# Patient Record
Sex: FEMALE | Race: WHITE | NOT HISPANIC OR LATINO | Employment: OTHER | URBAN - METROPOLITAN AREA
[De-identification: names, ages, dates, MRNs, and addresses within clinical notes are randomized per-mention and may not be internally consistent; named-entity substitution may affect disease eponyms.]

---

## 2017-03-02 RX ORDER — OXYCODONE AND ACETAMINOPHEN 10; 325 MG/1; MG/1
TABLET ORAL
Qty: 120 TABLET | Refills: 0 | Status: SHIPPED | OUTPATIENT
Start: 2017-03-02 | End: 2017-04-12 | Stop reason: SDUPTHER

## 2017-03-02 NOTE — TELEPHONE ENCOUNTER
Spoke to Luis from [facility] and calling for pain medication refill. Pended to you. States patient only has 2 days left.

## 2017-04-12 ENCOUNTER — TELEPHONE (OUTPATIENT)
Dept: FAMILY MEDICINE | Facility: CLINIC | Age: 77
End: 2017-04-12

## 2017-04-12 RX ORDER — OXYCODONE AND ACETAMINOPHEN 10; 325 MG/1; MG/1
TABLET ORAL
Qty: 120 TABLET | Refills: 0 | Status: SHIPPED | OUTPATIENT
Start: 2017-04-12 | End: 2017-11-08 | Stop reason: SDUPTHER

## 2017-04-12 NOTE — TELEPHONE ENCOUNTER
----- Message from Flora Wu sent at 4/12/2017  9:04 AM CDT -----  Refill on Rx Oxycodone Acetaminophen 10 mg.  Please send into [facility].  Any questions call Sae Melton/Leanna at [phone]

## 2017-04-13 RX ORDER — OXYCODONE AND ACETAMINOPHEN 10; 325 MG/1; MG/1
TABLET ORAL
Qty: 120 TABLET | Refills: 0 | Status: SHIPPED | OUTPATIENT
Start: 2017-04-13 | End: 2017-06-20 | Stop reason: SDUPTHER

## 2017-04-13 NOTE — TELEPHONE ENCOUNTER
----- Message from Liliane Marie sent at 4/13/2017 10:05 AM CDT -----  Contact: Leanna with Sae Melton # [phone]  Refill on Pain Medication   Call back on # [phone]  thanks      [facility]ANDRA [facility] - [place] - [address]   [place] [address]  Phone: [phone] Fax: [phone]

## 2017-04-17 ENCOUNTER — TELEPHONE (OUTPATIENT)
Dept: FAMILY MEDICINE | Facility: CLINIC | Age: 77
End: 2017-04-17

## 2017-04-17 NOTE — TELEPHONE ENCOUNTER
----- Message from Nicolette Avina sent at 4/17/2017  1:24 PM CDT -----  Contact: Janelle from [facility] in [place]  Calling again to has requested refill Rx Oxycodone to be sent to     [facility] - [place] - [address] [place] [address]  Phone: [phone] Fax: [phone]    Out of medication. Please call [phone] and ask for patient's nurse. Thanks!

## 2017-04-28 ENCOUNTER — EXTERNAL VISIT (OUTPATIENT)
Dept: FAMILY MEDICINE | Facility: CLINIC | Age: 77
End: 2017-04-28
Payer: MEDICARE

## 2017-04-28 DIAGNOSIS — G54.6 PHANTOM LIMB SYNDROME WITH PAIN: Primary | ICD-10-CM

## 2017-04-28 DIAGNOSIS — F32.A DEPRESSION, UNSPECIFIED DEPRESSION TYPE: ICD-10-CM

## 2017-04-28 DIAGNOSIS — E66.01 MORBID OBESITY DUE TO EXCESS CALORIES: ICD-10-CM

## 2017-04-28 PROCEDURE — 99308 SBSQ NF CARE LOW MDM 20: CPT | Mod: S$PBB,,, | Performed by: FAMILY MEDICINE

## 2017-05-19 ENCOUNTER — EXTERNAL VISIT (OUTPATIENT)
Dept: FAMILY MEDICINE | Facility: CLINIC | Age: 77
End: 2017-05-19
Payer: MEDICARE

## 2017-05-19 DIAGNOSIS — G54.6 PHANTOM LIMB SYNDROME WITH PAIN: Primary | ICD-10-CM

## 2017-05-19 DIAGNOSIS — M51.9 THORACIC, THORACOLUMBAR AND LUMBOSACRAL INTERVERTEBRAL DISC DISORDER: ICD-10-CM

## 2017-05-19 DIAGNOSIS — Z89.611 STATUS POST ABOVE KNEE AMPUTATION OF RIGHT LOWER EXTREMITY: Chronic | ICD-10-CM

## 2017-05-19 DIAGNOSIS — N18.30 CKD (CHRONIC KIDNEY DISEASE), STAGE III: Chronic | ICD-10-CM

## 2017-05-19 PROCEDURE — 99308 SBSQ NF CARE LOW MDM 20: CPT | Mod: S$PBB,,, | Performed by: FAMILY MEDICINE

## 2017-05-21 NOTE — PROGRESS NOTES
Subjective:     THIS DOCUMENT WAS MADE IN PART WITH [employer] DICTATION SOFTWARE.  OCCASIONALLY THIS SOFTWARE WILL MISINTERPRET WORDS OR PHRASES.     Patient ID: Nahun Cohen is a 77 y.o. female.    Chief Complaint: routine external nursing home visit    Complete past medical history, medications, allergies, current vitals, and labs are available in the nursing home chart.  The nursing home chart is more up-to-date than the electronic medical record here.  If there are any discrepancies or questions, deferred the nursing home chart.    [facility]ospitals in [place]      routine nursing on follow-up. The patient complains about worsening phantom limb syndrome. She has a history of a right above the knee amputation. Symptoms have been present waxing and waning for several years. She seemed to do best with the Lidoderm patches but the facility and/or her insurance company will not pay for that. They have also refused to try compounded topical lidocaine preparations all because of cost concerns, and I do understand that these parts are quite [place]. She remains on a very high dose of gabapentin and narcotics and I don't feel comfortable increasing either because they are high doses already. She has had mild improvement with topical Voltaren.     she has history depression which overall has improved. At times she has mild depressed symptoms but no suicidal thoughts and mostly appears cheerful     she does continue to suffer from morbid obesity and unfortunately this has not improved significantly and she remains sedentary because of her obesity and loss of limb. She has not had the motivation or the strength to proceed with prosthetic usage    Active Ambulatory Problems     Diagnosis Date Noted    Endometrial ca 08/01/2012    Fatigue 08/01/2012    Dermographia 08/23/2012    Morbid obesity due to excess calories 07/09/2013    Venous insufficiency (chronic) (peripheral) 07/18/2013    CKD (chronic kidney disease), stage III 08/27/2013     Sciatica of right side 09/19/2013    Lumbar stenosis 10/15/2013    Thoracic or lumbosacral neuritis or radiculitis 10/15/2013    Allergic rhinitis 10/17/2013    S/P AKA (above knee amputation) 06/28/2014    Phantom limb syndrome with pain 08/29/2015    Depression 08/29/2015     Resolved Ambulatory Problems     Diagnosis Date Noted    DVT (deep venous thrombosis) 11/29/2012    DVT of popliteal vein 01/23/2013    Elevated liver enzymes 02/21/2013    DVT (deep venous thrombosis) 07/10/2013    Cellulitis and abscess of lower leg 12/17/2013    DVT (deep venous thrombosis) 12/17/2013     Past Medical History:   Diagnosis Date    Allergy     Arthritis     Endometrial ca     Thyroid disease        Review of Systems   Constitutional: Positive for fatigue. Negative for fever.   Respiratory: Negative for cough, shortness of breath and wheezing.    Cardiovascular: Negative for chest pain and leg swelling.   Gastrointestinal: Negative for abdominal pain.   Musculoskeletal: Positive for arthralgias.   Neurological: Positive for weakness and numbness.       Objective:      Physical Exam   Constitutional: She appears well-developed and well-nourished. No distress.   HENT:   Head: Normocephalic and atraumatic.   Eyes: Conjunctivae are normal. No scleral icterus.   Neck: No JVD present.   Cardiovascular: Normal rate, regular rhythm and normal heart sounds.  Exam reveals no friction rub.    No murmur heard.  1 +edema and venous insufficiency changes left leg.   Pulmonary/Chest: Effort normal and breath sounds normal. She has no wheezes.   Musculoskeletal:   Status post right AKA, The stump looks fine, no wounds   Neurological: She is alert. She exhibits normal muscle tone.   Skin: She is not diaphoretic.   Vitals reviewed.      Assessment:       1. Phantom limb syndrome with pain    2. Depression, unspecified depression type    3. Morbid obesity due to excess calories           Plan:       Diagnoses and all orders  for this visit:    Phantom limb syndrome with pain   try compression, will again attempt to see if the facility can find a reasonably priced compounded topical lidocaine preparation. I would prefer the Lidoderm patches but they're not covered  Depression, unspecified depression type   overall improved  Morbid obesity due to excess calories   no improvement but stable     she appears to be developing some mild cognitive decline probably early dementia/mild cognitive impairment. She seems to have a more difficult time understanding that her right limb is not present. In the past she was understanding and able to comprehend but the symptoms remain real and her cognitive state appears to be declining making this a little more challenging.

## 2017-06-20 ENCOUNTER — TELEPHONE (OUTPATIENT)
Dept: FAMILY MEDICINE | Facility: CLINIC | Age: 77
End: 2017-06-20

## 2017-06-20 RX ORDER — OXYCODONE AND ACETAMINOPHEN 10; 325 MG/1; MG/1
TABLET ORAL
Qty: 120 TABLET | Refills: 0 | Status: SHIPPED | OUTPATIENT
Start: 2017-06-20 | End: 2017-08-31 | Stop reason: SDUPTHER

## 2017-06-21 NOTE — TELEPHONE ENCOUNTER
----- Message from Alessia Leonard sent at 6/21/2017 11:35 AM CDT -----  Sae Hall, #[phone] is requesting to speak with the nurse in regards to the order for the compression hose./ States they are unable to find one that fits.

## 2017-06-21 NOTE — TELEPHONE ENCOUNTER
Attempted to call [facility] left message to call clinic about compression stockings. Please advise.

## 2017-06-22 ENCOUNTER — TELEPHONE (OUTPATIENT)
Dept: FAMILY MEDICINE | Facility: CLINIC | Age: 77
End: 2017-06-22

## 2017-06-22 NOTE — TELEPHONE ENCOUNTER
Left message with Leonor at [facility] for Isabel to return call to clinic. States that she in in a morning meeting and she will give her the message. See message below.

## 2017-06-22 NOTE — TELEPHONE ENCOUNTER
----- Message from Gypsy Brothers sent at 6/22/2017 12:44 PM CDT -----  Contact: Isabel/Sae Hall returned call. Please call her at [phone].    Thanks,  Gypsy

## 2017-06-22 NOTE — TELEPHONE ENCOUNTER
Attempted to contact Isabel. Unable to reach her. Please try back later. See other telephone message from yesterday regarding compression stockings for patient.

## 2017-06-25 NOTE — PROGRESS NOTES
Subjective:     THIS DOCUMENT WAS MADE IN PART WITH [employer] DICTATION SOFTWARE.  OCCASIONALLY THIS SOFTWARE WILL MISINTERPRET WORDS OR PHRASES.     Patient ID: Nahun Cohen is a 77 y.o. female.    Chief Complaint: routine external nursing home visit    Complete past medical history, medications, allergies, current vitals, and labs are available in the nursing home chart.  The nursing home chart is more up-to-date than the electronic medical record here.  If there are any discrepancies or questions, deferred the nursing home chart.    HPI      Routine follow-up. Patient still seems focused on symptoms from phantom limb syndrome. She does not describe this as painful currently but it is clearly uncomfortable. They have not been able to find compression. No topical anesthetics or covered by the [facility].   She has a history of an above the knee amputation on the right. The stump is stable. She has chronic pain from thoracic and lumbosacral degenerative disc disease. She requires daily narcotics for pain control and has been stable. Unfortunately the obesity has not improved.    Active Ambulatory Problems     Diagnosis Date Noted    Endometrial ca 08/01/2012    Fatigue 08/01/2012    Dermographia 08/23/2012    Morbid obesity due to excess calories 07/09/2013    Venous insufficiency (chronic) (peripheral) 07/18/2013    CKD (chronic kidney disease), stage III 08/27/2013    Sciatica of right side 09/19/2013    Lumbar stenosis 10/15/2013    Thoracic or lumbosacral neuritis or radiculitis 10/15/2013    Allergic rhinitis 10/17/2013    S/P AKA (above knee amputation) 06/28/2014    Phantom limb syndrome with pain 08/29/2015    Depression 08/29/2015     Resolved Ambulatory Problems     Diagnosis Date Noted    DVT (deep venous thrombosis) 11/29/2012    DVT of popliteal vein 01/23/2013    Elevated liver enzymes 02/21/2013    DVT (deep venous thrombosis) 07/10/2013    Cellulitis  and abscess of lower leg 12/17/2013    DVT (deep venous thrombosis) 12/17/2013     Past Medical History:   Diagnosis Date    Allergy     Arthritis     Endometrial ca     Thyroid disease        Review of Systems   Constitutional: Positive for fatigue. Negative for fever.   HENT: Negative for trouble swallowing.    Respiratory: Negative for cough and shortness of breath.    Cardiovascular: Negative for chest pain.   Gastrointestinal: Negative for abdominal pain.   Musculoskeletal: Positive for arthralgias.   Neurological: Positive for weakness and numbness. Negative for tremors and speech difficulty.       Objective:      Physical Exam   Constitutional: She appears well-developed and well-nourished. No distress.   HENT:   Head: Normocephalic and atraumatic.   Eyes: Conjunctivae are normal. No scleral icterus.   Neck: No JVD present.   Cardiovascular: Normal rate, regular rhythm and normal heart sounds.  Exam reveals no friction rub.    No murmur heard.  1 +edema and venous insufficiency changes left leg.   Pulmonary/Chest: Effort normal and breath sounds normal. She has no wheezes.   Neurological: She is alert. She exhibits normal muscle tone.   Skin: She is not diaphoretic.   Vitals reviewed.      Assessment:       1. Phantom limb syndrome with pain    2. Status post above knee amputation of right lower extremity    3. Thoracic, thoracolumbar and lumbosacral intervertebral disc disorder    4. CKD (chronic kidney disease), stage III           Plan:        Overall stable. I have asked the institution begin to look for topical preparation. Try the compression. Encourage increasing activity even if it is exercises in bed. Recommend avoiding junk food and desserts and excessive calories, losing weight is critical for everything else improving.

## 2017-08-04 ENCOUNTER — TELEPHONE (OUTPATIENT)
Dept: FAMILY MEDICINE | Facility: CLINIC | Age: 77
End: 2017-08-04

## 2017-08-04 NOTE — TELEPHONE ENCOUNTER
----- Message from Alessia Leonard sent at 8/4/2017 10:13 AM CDT -----  Sae Dove,[phone], needs to speak with the nurse in regards to some forms they need filled out. States it would be in the plastic purple folder. She needs to know if it is ready to be up today.

## 2017-08-24 ENCOUNTER — TELEPHONE (OUTPATIENT)
Dept: ADMINISTRATIVE | Facility: HOSPITAL | Age: 77
End: 2017-08-24

## 2017-08-24 NOTE — TELEPHONE ENCOUNTER
Is pt true diabetic? 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  Should she be on registry?

## 2017-08-24 NOTE — TELEPHONE ENCOUNTER
I'll take a look at the A1c on file at the nursing home tomorrow.  I don't believe that I have officially diagnosed with diabetes.  I don't remember an A1c above 6.5 offhand.  A few fasting glucose is above 126 while on steroids may be what you are seeing but I'll check tomorrow assuming I make it to the nursing home

## 2017-08-25 NOTE — TELEPHONE ENCOUNTER
The most recent A1c was 6.2.  Several others were all less than 6.  So I have not found one greater than 6.5 so I would prefer to have her off the diabetic registry and have her diagnosis changed to impaired glucose tolerance.  thanks

## 2017-08-31 RX ORDER — OXYCODONE AND ACETAMINOPHEN 10; 325 MG/1; MG/1
TABLET ORAL
Qty: 120 TABLET | Refills: 0 | Status: SHIPPED | OUTPATIENT
Start: 2017-08-31 | End: 2017-10-11 | Stop reason: SDUPTHER

## 2017-09-22 ENCOUNTER — OFFICE VISIT (OUTPATIENT)
Dept: FAMILY MEDICINE | Facility: CLINIC | Age: 77
End: 2017-09-22
Payer: MEDICARE

## 2017-09-22 DIAGNOSIS — F32.A DEPRESSION, UNSPECIFIED DEPRESSION TYPE: ICD-10-CM

## 2017-09-22 DIAGNOSIS — G54.6 PHANTOM LIMB SYNDROME WITH PAIN: Primary | ICD-10-CM

## 2017-09-22 DIAGNOSIS — G31.84 MCI (MILD COGNITIVE IMPAIRMENT) WITH MEMORY LOSS: ICD-10-CM

## 2017-09-22 DIAGNOSIS — J42 CHRONIC BRONCHITIS, UNSPECIFIED CHRONIC BRONCHITIS TYPE: ICD-10-CM

## 2017-09-22 PROCEDURE — 99999 PR PBB SHADOW E&M-EST. PATIENT-LVL I: CPT | Mod: PBBFAC,,, | Performed by: FAMILY MEDICINE

## 2017-09-22 PROCEDURE — 99309 SBSQ NF CARE MODERATE MDM 30: CPT | Mod: S$PBB,,, | Performed by: FAMILY MEDICINE

## 2017-09-22 PROCEDURE — 99211 OFF/OP EST MAY X REQ PHY/QHP: CPT | Mod: PBBFAC,PN | Performed by: FAMILY MEDICINE

## 2017-09-24 NOTE — PROGRESS NOTES
Subjective:     THIS DOCUMENT WAS MADE IN PART WITH [employer] DICTATION SOFTWARE. OCCASIONALLY THIS SOFTWARE MAY MISINTERPRET WORDS OR PHRASES.     Patient ID: Nahun Cohen is a 77 y.o. female.    Chief Complaint: routine external nursing home visit    Complete past medical history, medications, allergies, current vitals, and labs are available in the nursing home chart.  The nursing home chart is more up-to-date than the electronic medical record here.  If there are any discrepancies or questions, deferred the nursing home chart.    HPI    Routine follow-up. Patient is anxious for me and requesting my presence through several staff members while I'm doing my normal rounds, impatient to wait for her turn.     She wants me to remove the bandages on her right foot. Of course she has a right BKA. She has known a phantom limb syndrome. It was noticeable improvement over the last few months after the Voltaren gel was restarted. That is still being applied but symptoms appear worsening.     She has a history of probably early dementia and mild cognitive impairment though she's had no formal memory test that I'm aware of. But I think this is, getting her phantom limb syndrome because it's reducing her own ability to recognize her symptoms and syndrome.     She does have chronic bronchitis, recent cough and exacerbation her staff but improving. Patient complains of mild cough but denies shortness of breath or wheezing     depression overall improved but difficult to evaluate given memory loss    Active Ambulatory Problems     Diagnosis Date Noted    Endometrial ca 08/01/2012    Fatigue 08/01/2012    Dermographia 08/23/2012    Morbid obesity due to excess calories 07/09/2013    Venous insufficiency (chronic) (peripheral) 07/18/2013    CKD (chronic kidney disease), stage III 08/27/2013    Sciatica of right side 09/19/2013    Lumbar stenosis 10/15/2013    Thoracic, thoracolumbar and lumbosacral intervertebral  disc disorder 10/15/2013    Allergic rhinitis 10/17/2013    S/P AKA (above knee amputation) 06/28/2014    Phantom limb syndrome with pain 08/29/2015    Depression 08/29/2015     Resolved Ambulatory Problems     Diagnosis Date Noted    DVT (deep venous thrombosis) 11/29/2012    DVT of popliteal vein 01/23/2013    Elevated liver enzymes 02/21/2013    DVT (deep venous thrombosis) 07/10/2013    Cellulitis and abscess of lower leg 12/17/2013    DVT (deep venous thrombosis) 12/17/2013     Past Medical History:   Diagnosis Date    Allergy     Arthritis     Endometrial ca     Thyroid disease        Review of Systems   Constitutional: Positive for fatigue. Negative for fever.   HENT: Negative for trouble swallowing.    Respiratory: Negative for cough and shortness of breath.    Cardiovascular: Negative for chest pain.   Gastrointestinal: Negative for abdominal pain.   Genitourinary: Negative for dysuria.   Musculoskeletal: Positive for arthralgias.   Neurological: Positive for weakness and numbness. Negative for tremors and speech difficulty.   Psychiatric/Behavioral: Positive for decreased concentration. Negative for hallucinations, sleep disturbance and suicidal ideas. The patient is nervous/anxious.        Objective:      Physical Exam   Constitutional: She appears well-developed and well-nourished. No distress.   HENT:   Head: Normocephalic and atraumatic.   Eyes: Conjunctivae are normal. No scleral icterus.   Neck: No JVD present.   Cardiovascular: Normal rate, regular rhythm and normal heart sounds.  Exam reveals no friction rub.    No murmur heard.  1 +edema and venous insufficiency changes left leg.   Pulmonary/Chest: Effort normal and breath sounds normal. She has no wheezes.   Musculoskeletal:   Right stump examined. There are no wounds and no skin breakdown. No rashes.   Neurological: She is alert. She exhibits normal muscle tone.   Skin: She is not diaphoretic.   Vitals reviewed.      Assessment:        1. Phantom limb syndrome with pain    2. MCI (mild cognitive impairment) with memory loss    3. Chronic bronchitis, unspecified chronic bronchitis type    4. Depression, unspecified depression type        Plan:       Diagnoses and all orders for this visit:    Phantom limb syndrome with pain   chronic condition recent exacerbation. I think her cognitive impairment in early dementia makes us more difficult because she doesn't remember from day-to-day this condition and that affects her conscious ability to cope better. Continue gabapentin, continued topical Aylett in. As I have stated I have recommended Lidoderm patches but apparently cost prohibitive here. Will begin Namenda at a low dose off label because this has been shown in some cases to help with phantom limb. May also apply Aspercreme  Between doses of Voltaren, this is more psychological but we'll see if it helps    MCI (mild cognitive impairment) with memory loss   as above    Chronic bronchitis, unspecified chronic bronchitis type   recent exacerbation appears improved    Depression, unspecified depression type   overall improved, continue close observation

## 2017-10-11 ENCOUNTER — TELEPHONE (OUTPATIENT)
Dept: FAMILY MEDICINE | Facility: CLINIC | Age: 77
End: 2017-10-11

## 2017-10-11 RX ORDER — OXYCODONE AND ACETAMINOPHEN 10; 325 MG/1; MG/1
TABLET ORAL
Qty: 120 TABLET | Refills: 0 | Status: SHIPPED | OUTPATIENT
Start: 2017-10-11 | End: 2017-11-27

## 2017-10-11 NOTE — TELEPHONE ENCOUNTER
----- Message from Alessia Leonard sent at 10/11/2017  2:36 PM CDT -----  Laureen (CRYS), [facility], [phone] states the pt needs a refill on her Oxycodone  mg./ She will be out by the weekend./     ALEJANDRA [facility] [employer] [facility] - [place] - [address]  [address] [place] [address]  Phone: [phone] Fax: [phone]

## 2017-10-11 NOTE — TELEPHONE ENCOUNTER
Spoke with [facility] and they conformed they received Rx over fax and it has been filled.

## 2017-11-08 RX ORDER — OXYCODONE AND ACETAMINOPHEN 10; 325 MG/1; MG/1
TABLET ORAL
Qty: 120 TABLET | Refills: 0 | Status: SHIPPED | OUTPATIENT
Start: 2017-11-08 | End: 2017-11-27

## 2017-11-08 NOTE — TELEPHONE ENCOUNTER
Sae Melton faxed request for Rx:    Oxycodone/acetaminophen 10/325 mg PO TID   To [facility]

## 2017-11-27 RX ORDER — OXYCODONE AND ACETAMINOPHEN 5; 325 MG/1; MG/1
1 TABLET ORAL EVERY 4 HOURS PRN
Qty: 120 TABLET | Refills: 0 | Status: SHIPPED | OUTPATIENT
Start: 2017-11-27 | End: 2018-01-05 | Stop reason: SDUPTHER

## 2017-11-27 NOTE — TELEPHONE ENCOUNTER
----- Message from Allison Brown sent at 11/27/2017 10:56 AM CST -----  Contact: Laureen kemp/ Sae kemp/ Sae Melton calling to speak with the Nurse about medication and dosage. Please advise.  Call back Laureen Trimble   Thanks!

## 2017-11-27 NOTE — TELEPHONE ENCOUNTER
Laureen is calling to see what percocet pt is suppose to be on.     If pt is suppose to be on 5/325 mg every four hours, the home needs a script for it.    If the pt is to continue the 10/325 mg, how often will she be able to take it?    Please review and advise. Thank you!

## 2017-12-22 ENCOUNTER — OFFICE VISIT (OUTPATIENT)
Dept: FAMILY MEDICINE | Facility: CLINIC | Age: 77
End: 2017-12-22
Payer: MEDICARE

## 2017-12-22 DIAGNOSIS — N18.30 CKD (CHRONIC KIDNEY DISEASE), STAGE III: ICD-10-CM

## 2017-12-22 DIAGNOSIS — E66.01 MORBID OBESITY DUE TO EXCESS CALORIES: ICD-10-CM

## 2017-12-22 DIAGNOSIS — G31.84 MCI (MILD COGNITIVE IMPAIRMENT) WITH MEMORY LOSS: Primary | ICD-10-CM

## 2017-12-22 DIAGNOSIS — F32.A DEPRESSION, UNSPECIFIED DEPRESSION TYPE: ICD-10-CM

## 2017-12-22 DIAGNOSIS — Z89.611 STATUS POST ABOVE KNEE AMPUTATION OF RIGHT LOWER EXTREMITY: ICD-10-CM

## 2017-12-22 PROCEDURE — 99211 OFF/OP EST MAY X REQ PHY/QHP: CPT | Mod: PBBFAC,PN | Performed by: FAMILY MEDICINE

## 2017-12-22 PROCEDURE — 99999 PR PBB SHADOW E&M-EST. PATIENT-LVL I: CPT | Mod: PBBFAC,,, | Performed by: FAMILY MEDICINE

## 2017-12-22 PROCEDURE — 99308 SBSQ NF CARE LOW MDM 20: CPT | Mod: S$PBB,,, | Performed by: FAMILY MEDICINE

## 2017-12-31 NOTE — PROGRESS NOTES
Subjective:     THIS DOCUMENT WAS MADE IN PART WITH [employer] DICTATION SOFTWARE.  OCCASIONALLY THIS SOFTWARE WILL MISINTERPRET WORDS OR PHRASES.     Patient ID: Nahun Cohen is a 77 y.o. female.    Chief Complaint: routine external nursing home visit    Complete past medical history, medications, allergies, current vitals, and labs are available in the nursing home chart.  The nursing home chart is more up-to-date than the electronic medical record here.  If there are any discrepancies or questions, deferred the nursing home chart.    HPI      routine follow-up. This 77 old female with multiple chronic problems appears well today. She is out of bed in a wheelchair something I have not seen in quite some time. Staff reports that she seems better, happier, fewer complaints since she has been getting out of bed more. Her history includes mild cognitive impairment which has been stable without any behavioral problems. Depression appears improved. She is status post above the invitation, she does have chronic phantom limit syndrome, no skin wounds or breakdown. She has morbid obesity which has not improved. She has chronic kidney disease which is stable.  Review of Systems   Constitutional: Positive for fatigue. Negative for fever.   HENT: Negative for trouble swallowing.    Respiratory: Negative for cough, shortness of breath and wheezing.    Cardiovascular: Negative for chest pain.   Gastrointestinal: Negative for abdominal pain.   Genitourinary: Negative for dysuria.   Musculoskeletal: Positive for arthralgias and gait problem.   Neurological: Positive for weakness and numbness. Negative for tremors and speech difficulty.   Psychiatric/Behavioral: Positive for dysphoric mood ( Overall improved). Negative for behavioral problems, hallucinations, sleep disturbance and suicidal ideas.       Objective:      Physical Exam   Constitutional: She appears well-developed and well-nourished. No distress.   HENT:   Head:  Normocephalic and atraumatic.   Eyes: No scleral icterus.   Neck: No JVD present.   Cardiovascular: Normal rate, regular rhythm and normal heart sounds.  Exam reveals no gallop and no friction rub.    No murmur heard.  1 - 2+edema and venous insufficiency changes left leg.  Status post right above the patient   Pulmonary/Chest: Effort normal and breath sounds normal. No respiratory distress. She has no wheezes. She has no rales.   Neurological: She is alert. She exhibits normal muscle tone.   Skin: She is not diaphoretic.   Vitals reviewed.      Assessment:       1. MCI (mild cognitive impairment) with memory loss    2. Depression, unspecified depression type    3. Status post above knee amputation of right lower extremity    4. CKD (chronic kidney disease), stage III    5. Morbid obesity due to excess calories        Plan:       Diagnoses and all orders for this visit:    MCI (mild cognitive impairment) with memory loss   stable  Depression, unspecified depression type   improved  Status post above knee amputation of right lower extremity   stable  CKD (chronic kidney disease), stage III  stable  Morbid obesity due to excess calories   stable        Overall she is improving and I think getting out of bed more often has a lot to do with this. Encouraged her to remain more social and physically active within her limits.

## 2018-01-05 ENCOUNTER — TELEPHONE (OUTPATIENT)
Dept: FAMILY MEDICINE | Facility: CLINIC | Age: 78
End: 2018-01-05

## 2018-01-05 RX ORDER — OXYCODONE AND ACETAMINOPHEN 5; 325 MG/1; MG/1
1 TABLET ORAL EVERY 4 HOURS PRN
Qty: 120 TABLET | Refills: 0 | Status: SHIPPED | OUTPATIENT
Start: 2018-01-05 | End: 2018-03-12 | Stop reason: SDUPTHER

## 2018-01-05 NOTE — TELEPHONE ENCOUNTER
[facility] does not accept electronic prescriptions. Therefore I have to print and then fax. I can do this Monday.     If the patient does not have enough medicine to last until Monday then I can prescribe this electronically to [facility] which I have done before for nursing home patients. How would she like me to proceed?

## 2018-01-05 NOTE — TELEPHONE ENCOUNTER
lov 12/22/17  See previous note  Laureen informed that Dr. Lyon is out of the office until Monday.

## 2018-01-05 NOTE — TELEPHONE ENCOUNTER
Spoke with alexandr at [facility] and she states pt will run out of med, so it can be sent to [facility]'s

## 2018-01-08 ENCOUNTER — TELEPHONE (OUTPATIENT)
Dept: FAMILY MEDICINE | Facility: CLINIC | Age: 78
End: 2018-01-08

## 2018-01-26 ENCOUNTER — OFFICE VISIT (OUTPATIENT)
Dept: FAMILY MEDICINE | Facility: CLINIC | Age: 78
End: 2018-01-26
Payer: MEDICARE

## 2018-01-26 DIAGNOSIS — G31.84 MCI (MILD COGNITIVE IMPAIRMENT) WITH MEMORY LOSS: ICD-10-CM

## 2018-01-26 DIAGNOSIS — N18.30 CKD (CHRONIC KIDNEY DISEASE), STAGE III: ICD-10-CM

## 2018-01-26 DIAGNOSIS — G54.6 PHANTOM LIMB SYNDROME WITH PAIN: Primary | ICD-10-CM

## 2018-01-26 DIAGNOSIS — E66.01 MORBID OBESITY DUE TO EXCESS CALORIES: ICD-10-CM

## 2018-01-26 DIAGNOSIS — F32.A DEPRESSION, UNSPECIFIED DEPRESSION TYPE: ICD-10-CM

## 2018-01-26 DIAGNOSIS — J42 CHRONIC BRONCHITIS, UNSPECIFIED CHRONIC BRONCHITIS TYPE: ICD-10-CM

## 2018-01-26 PROCEDURE — 99211 OFF/OP EST MAY X REQ PHY/QHP: CPT | Mod: PBBFAC,PN | Performed by: FAMILY MEDICINE

## 2018-01-26 PROCEDURE — 99999 PR PBB SHADOW E&M-EST. PATIENT-LVL I: CPT | Mod: PBBFAC,,, | Performed by: FAMILY MEDICINE

## 2018-01-26 PROCEDURE — 99308 SBSQ NF CARE LOW MDM 20: CPT | Mod: S$PBB,,, | Performed by: FAMILY MEDICINE

## 2018-02-25 NOTE — PROGRESS NOTES
Subjective:     THIS DOCUMENT WAS MADE IN PART WITH [employer] DICTATION SOFTWARE.  OCCASIONALLY THIS SOFTWARE WILL MISINTERPRET WORDS OR PHRASES.     Patient ID: Nahun Cohen is a 77 y.o. female.    Chief Complaint: routine external nursing home visit    Complete past medical history, medications, allergies, current vitals, and labs are available in the nursing home chart.  The nursing home chart is more up-to-date than the electronic medical record here.  If there are any discrepancies or questions, deferred the nursing home chart.    HPI     routine follow-up. 77-year-old female residing here at the nursing home. She has a history of phantom limb syndrome that fluctuate but continues to be better now that she's getting out of bed more. In fact she does not complain of this today. She has a history of mild cognitive impairment and depression these also appear improved as she's getting out of bed. Says history of chronic bronchitis, no recent exacerbations. She does continue to remain relatively sedentary and obesity has not improved    Active Ambulatory Problems     Diagnosis Date Noted    Endometrial ca 08/01/2012    Fatigue 08/01/2012    Dermographia 08/23/2012    Morbid obesity due to excess calories 07/09/2013    Venous insufficiency (chronic) (peripheral) 07/18/2013    CKD (chronic kidney disease), stage III 08/27/2013    Sciatica of right side 09/19/2013    Lumbar stenosis 10/15/2013    Thoracic, thoracolumbar and lumbosacral intervertebral disc disorder 10/15/2013    Allergic rhinitis 10/17/2013    S/P AKA (above knee amputation) 06/28/2014    Phantom limb syndrome with pain 08/29/2015    Depression 08/29/2015     Resolved Ambulatory Problems     Diagnosis Date Noted    DVT (deep venous thrombosis) 11/29/2012    DVT of popliteal vein 01/23/2013    Elevated liver enzymes 02/21/2013    DVT (deep venous thrombosis) 07/10/2013    Cellulitis and abscess of lower leg 12/17/2013    DVT  (deep venous thrombosis) 12/17/2013     Past Medical History:   Diagnosis Date    Allergy     Arthritis     Endometrial ca     Thyroid disease           Review of Systems   Constitutional: Negative for fever and unexpected weight change.   HENT: Negative for trouble swallowing.    Respiratory: Negative for cough, shortness of breath and wheezing.    Cardiovascular: Negative for chest pain.   Gastrointestinal: Negative for abdominal pain.   Genitourinary: Negative for dysuria.   Musculoskeletal: Positive for arthralgias and gait problem.   Neurological: Positive for weakness and numbness. Negative for tremors, speech difficulty and headaches.   Psychiatric/Behavioral: Positive for dysphoric mood ( Overall improved). Negative for behavioral problems, hallucinations, sleep disturbance and suicidal ideas.       Objective:      Physical Exam   Constitutional: She appears well-developed and well-nourished. No distress.   HENT:   Head: Normocephalic and atraumatic.   Eyes: No scleral icterus.   Neck: No JVD present.   Cardiovascular: Normal rate, regular rhythm and normal heart sounds.  Exam reveals no gallop and no friction rub.    No murmur heard.  2+edema and venous insufficiency changes left leg.  Status post right above the knee amputation   Pulmonary/Chest: Effort normal and breath sounds normal. No respiratory distress. She has no wheezes. She has no rales.   Neurological: She is alert. She exhibits normal muscle tone.   Skin: She is not diaphoretic.   Vitals reviewed.      Assessment:       1. Phantom limb syndrome with pain    2. MCI (mild cognitive impairment) with memory loss    3. Depression, unspecified depression type    4. Chronic bronchitis, unspecified chronic bronchitis type    5. CKD (chronic kidney disease), stage III    6. Morbid obesity due to excess calories        Plan:       Diagnoses and all orders for this visit:     overall everything is stable and reasonably well-controlled. Continue  current medications, continue to encourage more socialization and more time out of bed as tolerated.

## 2018-03-12 RX ORDER — OXYCODONE AND ACETAMINOPHEN 5; 325 MG/1; MG/1
1 TABLET ORAL EVERY 4 HOURS PRN
Qty: 120 TABLET | Refills: 0 | Status: SHIPPED | OUTPATIENT
Start: 2018-03-12 | End: 2018-06-18 | Stop reason: SDUPTHER

## 2018-03-12 NOTE — TELEPHONE ENCOUNTER
Sae Melton is requesting refill for pt's percocet 5-325mg    Please print Rx, pended to print     [facility]   (fax [phone])

## 2018-03-23 ENCOUNTER — OFFICE VISIT (OUTPATIENT)
Dept: FAMILY MEDICINE | Facility: CLINIC | Age: 78
End: 2018-03-23
Payer: MEDICARE

## 2018-03-23 DIAGNOSIS — F32.A DEPRESSION, UNSPECIFIED DEPRESSION TYPE: ICD-10-CM

## 2018-03-23 DIAGNOSIS — G54.6 PHANTOM LIMB SYNDROME WITH PAIN: Primary | ICD-10-CM

## 2018-03-23 DIAGNOSIS — N18.30 CKD (CHRONIC KIDNEY DISEASE), STAGE III: ICD-10-CM

## 2018-03-23 DIAGNOSIS — G31.84 MCI (MILD COGNITIVE IMPAIRMENT) WITH MEMORY LOSS: ICD-10-CM

## 2018-03-23 PROCEDURE — 99309 SBSQ NF CARE MODERATE MDM 30: CPT | Mod: S$PBB,,, | Performed by: FAMILY MEDICINE

## 2018-03-23 PROCEDURE — 99999 PR PBB SHADOW E&M-EST. PATIENT-LVL I: CPT | Mod: PBBFAC,,, | Performed by: FAMILY MEDICINE

## 2018-03-23 PROCEDURE — 99211 OFF/OP EST MAY X REQ PHY/QHP: CPT | Mod: PBBFAC,PN | Performed by: FAMILY MEDICINE

## 2018-03-25 NOTE — PROGRESS NOTES
Subjective:     THIS DOCUMENT WAS MADE IN PART WITH [employer] DICTATION SOFTWARE. OCCASIONALLY THIS SOFTWARE MAY MISINTERPRET WORDS OR PHRASES.     Patient ID: Nahun Cohen is a 78 y.o. female.    Chief Complaint: routine external nursing home visit    Complete past medical history, medications, allergies, current vitals, and labs are available in the nursing home chart.  The nursing home chart is more up-to-date than the electronic medical record here.  If there are any discrepancies or questions, deferred the nursing home chart.    HPI      78-year-old female with no significant concern regarding her phantom limb syndrome. She was doing better several months ago when she was more active and out of bed more. But over the last couple months she has been back in bed and not wanting to get out and refusing to get out. This has been combined with the fact that apparently her insurance company will not pay for the Voltaren gel topically which was helping. Of course they also refused to stop paying for her Lidoderm patches which was helping prior. Other options included compounded topical solutions but these have been cost prohibitive of well. She remains on multiple oral medications/antiepileptics. Really don't have much room to increase these without severe side effects potentials. She does remain on narcotics so I have tried to reduce the dosage     she has early dementia at least mild cognitive impairment and often forgets that she doesn't have a foot and claims that she can see her feet and toes as well, this has been frustrating to the staff who has to repeatedly remind her that she had an ambutation     Her chronic kidney disease has been stable.    Active Ambulatory Problems     Diagnosis Date Noted    Endometrial ca 08/01/2012    Fatigue 08/01/2012    Dermographia 08/23/2012    Morbid obesity due to excess calories 07/09/2013    Venous insufficiency (chronic) (peripheral) 07/18/2013    CKD (chronic  kidney disease), stage III 08/27/2013    Sciatica of right side 09/19/2013    Lumbar stenosis 10/15/2013    Thoracic, thoracolumbar and lumbosacral intervertebral disc disorder 10/15/2013    Allergic rhinitis 10/17/2013    S/P AKA (above knee amputation) 06/28/2014    Phantom limb syndrome with pain 08/29/2015    Depression 08/29/2015     Resolved Ambulatory Problems     Diagnosis Date Noted    DVT (deep venous thrombosis) 11/29/2012    DVT of popliteal vein 01/23/2013    Elevated liver enzymes 02/21/2013    DVT (deep venous thrombosis) 07/10/2013    Cellulitis and abscess of lower leg 12/17/2013    DVT (deep venous thrombosis) 12/17/2013     Past Medical History:   Diagnosis Date    Allergy     Arthritis     Endometrial ca     Thyroid disease        Review of Systems   Constitutional: Negative for fever and unexpected weight change.   HENT: Negative for trouble swallowing.    Respiratory: Negative for cough, shortness of breath and wheezing.    Cardiovascular: Negative for chest pain.   Gastrointestinal: Negative for abdominal pain.   Genitourinary: Negative for dysuria.   Musculoskeletal: Positive for arthralgias and gait problem.   Neurological: Positive for weakness and numbness. Negative for tremors, speech difficulty and headaches.   Psychiatric/Behavioral: Positive for dysphoric mood ( Improving though possible mild recent worsening with more time in bed). Negative for behavioral problems, hallucinations, sleep disturbance and suicidal ideas.       Objective:      Physical Exam   Constitutional: She appears well-developed and well-nourished. No distress.   HENT:   Head: Normocephalic and atraumatic.   Eyes: No scleral icterus.   Neck: No JVD present.   Cardiovascular: Normal rate, regular rhythm and normal heart sounds.  Exam reveals no gallop and no friction rub.    No murmur heard.  1-2+edema and venous insufficiency changes left leg.  Status post right above the knee amputation    Pulmonary/Chest: Effort normal and breath sounds normal. No respiratory distress. She has no wheezes. She has no rales.   Neurological: She is alert. She exhibits normal muscle tone.   Skin: She is not diaphoretic.   Vitals reviewed.      Assessment:       1. Phantom limb syndrome with pain    2. MCI (mild cognitive impairment) with memory loss    3. Depression, unspecified depression type    4. CKD (chronic kidney disease), stage III        Plan:       Diagnoses and all orders for this visit:    Phantom limb syndrome with pain   she was doing better on Voltaren gel and increased mobility, request staff to try prior authorization. Of course I would be happy to write a Lidoderm patch which helped her as well but insurance would not cover it. If this doesn't help then we'll see if the institution can find a compounding pharmacy who might be able to make something similar. She already remains on a high dose of an antiepileptic. I also placed her on Namenda off label which can help with phantom limb and she remains on a narcotic. Additional options are very limited  MCI (mild cognitive impairment) with memory loss   progress in complicating her other issues  Depression, unspecified depression type   overall improved this year with recent exacerbation because of pain  CKD (chronic kidney disease), stage III   stable

## 2018-04-27 ENCOUNTER — OFFICE VISIT (OUTPATIENT)
Dept: FAMILY MEDICINE | Facility: CLINIC | Age: 78
End: 2018-04-27
Payer: MEDICARE

## 2018-04-27 DIAGNOSIS — Z89.611 STATUS POST ABOVE KNEE AMPUTATION OF RIGHT LOWER EXTREMITY: ICD-10-CM

## 2018-04-27 DIAGNOSIS — G54.6 PHANTOM LIMB SYNDROME WITH PAIN: Primary | ICD-10-CM

## 2018-04-27 DIAGNOSIS — G31.84 MCI (MILD COGNITIVE IMPAIRMENT) WITH MEMORY LOSS: ICD-10-CM

## 2018-04-27 DIAGNOSIS — J42 CHRONIC BRONCHITIS, UNSPECIFIED CHRONIC BRONCHITIS TYPE: ICD-10-CM

## 2018-04-27 PROCEDURE — 99999 PR PBB SHADOW E&M-EST. PATIENT-LVL I: CPT | Mod: PBBFAC,,, | Performed by: FAMILY MEDICINE

## 2018-04-27 PROCEDURE — 99308 SBSQ NF CARE LOW MDM 20: CPT | Mod: S$PBB,,, | Performed by: FAMILY MEDICINE

## 2018-04-27 PROCEDURE — 99211 OFF/OP EST MAY X REQ PHY/QHP: CPT | Mod: PBBFAC,PN | Performed by: FAMILY MEDICINE

## 2018-05-25 ENCOUNTER — TELEPHONE (OUTPATIENT)
Dept: FAMILY MEDICINE | Facility: CLINIC | Age: 78
End: 2018-05-25

## 2018-05-25 NOTE — TELEPHONE ENCOUNTER
----- Message from Anju Shrestha sent at 5/25/2018  1:25 PM CDT -----  Contact: Laureen DOWELL' from nursing home  Laureen needs an order for antibiotic eye drops, etc for PT left eye. Dr just left and forgot to give them the orders for this. Please call back and advise.    Call back# [phone]  Thanks

## 2018-05-27 NOTE — PROGRESS NOTES
Subjective:     THIS DOCUMENT WAS MADE IN PART WITH [employer] DICTATION SOFTWARE. OCCASIONALLY THIS SOFTWARE MAY MISINTERPRET WORDS OR PHRASES.     Patient ID: Nahun Cohen is a 78 y.o. female.    Chief Complaint: routine external nursing home visit    Complete past medical history, medications, allergies, current vitals, and labs are available in the nursing home chart.  The nursing home chart is more up-to-date than the electronic medical record here.  If there are any discrepancies or questions, deferred the nursing home chart.    HPI    Routine follow-up.  Patient with early dementia appears to be progressing. She has hypertension and chronic bronchitis which have been stable. Phantom limb syndrome has been persistent and difficult to manage. Especially with memory loss, every day she has to be reminded that she doesn't have a leg but this doesn't seem to be effective. medication concerns cost coverage, what her insurance and/or the institution will agree to cover, etc. Have been discussed repeatedly in the past and nothing here has changed . It's not constructive for me to repeat myself here      Active Ambulatory Problems     Diagnosis Date Noted    Endometrial ca 08/01/2012    Fatigue 08/01/2012    Dermographia 08/23/2012    Morbid obesity due to excess calories 07/09/2013    Venous insufficiency (chronic) (peripheral) 07/18/2013    CKD (chronic kidney disease), stage III 08/27/2013    Sciatica of right side 09/19/2013    Lumbar stenosis 10/15/2013    Thoracic, thoracolumbar and lumbosacral intervertebral disc disorder 10/15/2013    Allergic rhinitis 10/17/2013    S/P AKA (above knee amputation) 06/28/2014    Phantom limb syndrome with pain 08/29/2015    Depression 08/29/2015     Resolved Ambulatory Problems     Diagnosis Date Noted    DVT (deep venous thrombosis) 11/29/2012    DVT of popliteal vein 01/23/2013    Elevated liver enzymes 02/21/2013    DVT (deep venous thrombosis)  07/10/2013    Cellulitis and abscess of lower leg 12/17/2013    DVT (deep venous thrombosis) 12/17/2013     Past Medical History:   Diagnosis Date    Allergy     Arthritis     Endometrial ca     Thyroid disease        Review of Systems   Constitutional: Negative for fever and unexpected weight change.   HENT: Negative for trouble swallowing.    Respiratory: Negative for cough, shortness of breath and wheezing.    Cardiovascular: Negative for chest pain.   Gastrointestinal: Negative for abdominal pain, nausea and vomiting.   Genitourinary: Negative for dysuria.   Musculoskeletal: Positive for arthralgias and gait problem.   Neurological: Positive for weakness and numbness. Negative for tremors, speech difficulty and headaches.   Psychiatric/Behavioral: Positive for confusion, decreased concentration, dysphoric mood (improved) and sleep disturbance. Negative for behavioral problems and hallucinations.       Objective:      Physical Exam   Constitutional: She appears well-developed and well-nourished. No distress.   HENT:   Head: Normocephalic and atraumatic.   Eyes: No scleral icterus.   Neck: No JVD present.   Cardiovascular: Normal rate, regular rhythm and normal heart sounds.  Exam reveals no gallop and no friction rub.    No murmur heard.  2+edema and venous insufficiency changes left leg.  Status post right above the knee amputation   Pulmonary/Chest: Effort normal and breath sounds normal. No respiratory distress. She has no wheezes. She has no rales.   Neurological: She is alert. She exhibits normal muscle tone.   Skin: She is not diaphoretic.   Vitals reviewed.      Assessment:       1. Phantom limb syndrome with pain    2. MCI (mild cognitive impairment) with memory loss    3. Chronic bronchitis, unspecified chronic bronchitis type    4. Status post above knee amputation of right lower extremity        Plan:       Diagnoses and all orders for this visit:    Phantom limb syndrome with pain   the  , the family is paying for this with mild help. To discuss other options with therapy including possibly a TENS unit. Family may have to purchase this but therapy can show her how to use it though again memory loss dementia can be impairing. We'll see how the Voltaren works and reevaluate after a few weeks    MCI (mild cognitive impairment) with memory loss   worsening  Depression, unspecified depression type   stable  CKD (chronic kidney disease), stage III   stable

## 2018-06-18 RX ORDER — OXYCODONE AND ACETAMINOPHEN 5; 325 MG/1; MG/1
1 TABLET ORAL EVERY 4 HOURS PRN
Qty: 120 TABLET | Refills: 0 | Status: SHIPPED | OUTPATIENT
Start: 2018-06-18 | End: 2018-08-01 | Stop reason: SDUPTHER

## 2018-07-27 ENCOUNTER — OFFICE VISIT (OUTPATIENT)
Dept: FAMILY MEDICINE | Facility: CLINIC | Age: 78
End: 2018-07-27
Payer: MEDICARE

## 2018-07-27 DIAGNOSIS — J42 CHRONIC BRONCHITIS, UNSPECIFIED CHRONIC BRONCHITIS TYPE: ICD-10-CM

## 2018-07-27 DIAGNOSIS — G31.84 MCI (MILD COGNITIVE IMPAIRMENT) WITH MEMORY LOSS: Primary | ICD-10-CM

## 2018-07-27 DIAGNOSIS — G54.6 PHANTOM LIMB SYNDROME WITH PAIN: ICD-10-CM

## 2018-07-27 DIAGNOSIS — E66.01 MORBID OBESITY DUE TO EXCESS CALORIES: ICD-10-CM

## 2018-07-27 DIAGNOSIS — F32.A DEPRESSION, UNSPECIFIED DEPRESSION TYPE: ICD-10-CM

## 2018-07-27 PROCEDURE — 99211 OFF/OP EST MAY X REQ PHY/QHP: CPT | Mod: PBBFAC,PN | Performed by: FAMILY MEDICINE

## 2018-07-27 PROCEDURE — 99999 PR PBB SHADOW E&M-EST. PATIENT-LVL I: CPT | Mod: PBBFAC,,, | Performed by: FAMILY MEDICINE

## 2018-07-27 PROCEDURE — 99308 SBSQ NF CARE LOW MDM 20: CPT | Mod: S$PBB,,, | Performed by: FAMILY MEDICINE

## 2018-08-01 RX ORDER — OXYCODONE AND ACETAMINOPHEN 5; 325 MG/1; MG/1
1 TABLET ORAL EVERY 4 HOURS PRN
Qty: 120 TABLET | Refills: 0 | Status: SHIPPED | OUTPATIENT
Start: 2018-08-01 | End: 2018-08-07 | Stop reason: SDUPTHER

## 2018-08-07 RX ORDER — OXYCODONE AND ACETAMINOPHEN 5; 325 MG/1; MG/1
1 TABLET ORAL EVERY 4 HOURS PRN
Qty: 120 TABLET | Refills: 0 | Status: SHIPPED | OUTPATIENT
Start: 2018-08-07 | End: 2018-11-01 | Stop reason: SDUPTHER

## 2018-08-26 NOTE — PROGRESS NOTES
Subjective:     THIS DOCUMENT WAS MADE IN PART WITH [employer] DICTATION SOFTWARE. OCCASIONALLY THIS SOFTWARE MAY MISINTERPRET WORDS OR PHRASES.     Patient ID: Nahun Cohen is a 78 y.o. female.    Chief Complaint: routine external nursing home visit    Complete past medical history, medications, allergies, current vitals, and labs are available in the nursing home chart.  The nursing home chart is more up-to-date than the electronic medical record here.  If there are any discrepancies or questions, deferred the nursing home chart.    HPI      MCI (mild cognitive impairment) with memory loss  No recent formal testing but memory seems to be gradually worsening.  Phantom limb syndrome with pain   Nurse reports she is doing mildly better. Frequent elevation of the stump seems to help along with previous treatments. Patient's memory loss does affect her understanding and management  Chronic bronchitis, unspecified chronic bronchitis type   stable the recent exacerbations  Depression, unspecified depression type   seem stable but difficult to evaluate with memory loss. But certainly no worsening, suicidal thoughts or new symptoms  Morbid obesity due to excess calories   unchanged          Active Ambulatory Problems     Diagnosis Date Noted    Endometrial ca 08/01/2012    Fatigue 08/01/2012    Dermographia 08/23/2012    Morbid obesity due to excess calories 07/09/2013    Venous insufficiency (chronic) (peripheral) 07/18/2013    CKD (chronic kidney disease), stage III 08/27/2013    Sciatica of right side 09/19/2013    Lumbar stenosis 10/15/2013    Thoracic, thoracolumbar and lumbosacral intervertebral disc disorder 10/15/2013    Allergic rhinitis 10/17/2013    S/P AKA (above knee amputation) 06/28/2014    Phantom limb syndrome with pain 08/29/2015    Depression 08/29/2015     Resolved Ambulatory Problems     Diagnosis Date Noted    DVT (deep venous thrombosis) 11/29/2012    DVT of popliteal vein  01/23/2013    Elevated liver enzymes 02/21/2013    DVT (deep venous thrombosis) 07/10/2013    Cellulitis and abscess of lower leg 12/17/2013    DVT (deep venous thrombosis) 12/17/2013     Past Medical History:   Diagnosis Date    Allergy     Arthritis     Endometrial ca     Thyroid disease        Review of Systems   Constitutional: Negative for fever and unexpected weight change.   HENT: Negative for trouble swallowing.    Respiratory: Negative for cough, shortness of breath and wheezing.    Cardiovascular: Negative for chest pain.   Gastrointestinal: Negative for abdominal pain, nausea and vomiting.   Genitourinary: Negative for dysuria.   Musculoskeletal: Positive for arthralgias and gait problem.   Neurological: Positive for weakness and numbness. Negative for tremors, speech difficulty and headaches.   Psychiatric/Behavioral: Positive for sleep disturbance. Negative for behavioral problems and hallucinations.       Objective:      Physical Exam   Constitutional: She appears well-developed and well-nourished. No distress.   HENT:   Head: Normocephalic and atraumatic.   Eyes: No scleral icterus.   Neck: No JVD present.   Cardiovascular: Normal rate, regular rhythm and normal heart sounds. Exam reveals no gallop and no friction rub.   No murmur heard.  2+edema and venous insufficiency changes left leg.  Status post right above the knee amputation   Pulmonary/Chest: Effort normal and breath sounds normal. No respiratory distress. She has no wheezes. She has no rales.   Abdominal: Soft. Bowel sounds are normal. She exhibits no distension and no mass. There is no tenderness. There is no guarding.   Neurological: She is alert.   Skin: She is not diaphoretic.   Vitals reviewed.      Assessment:       1. MCI (mild cognitive impairment) with memory loss    2. Phantom limb syndrome with pain    3. Chronic bronchitis, unspecified chronic bronchitis type    4. Depression, unspecified depression type    5. Morbid  obesity due to excess calories        Plan:       Diagnoses and all orders for this visit:     overall improvement in phantom limb complaints. Everything else is stable continue current medications

## 2018-09-28 ENCOUNTER — OFFICE VISIT (OUTPATIENT)
Dept: FAMILY MEDICINE | Facility: CLINIC | Age: 78
End: 2018-09-28
Payer: MEDICARE

## 2018-09-28 DIAGNOSIS — J42 CHRONIC BRONCHITIS, UNSPECIFIED CHRONIC BRONCHITIS TYPE: ICD-10-CM

## 2018-09-28 DIAGNOSIS — G31.84 MCI (MILD COGNITIVE IMPAIRMENT) WITH MEMORY LOSS: Primary | ICD-10-CM

## 2018-09-28 DIAGNOSIS — F32.A DEPRESSION, UNSPECIFIED DEPRESSION TYPE: ICD-10-CM

## 2018-09-28 DIAGNOSIS — G54.6 PHANTOM LIMB SYNDROME WITH PAIN: ICD-10-CM

## 2018-09-28 PROCEDURE — 99999 PR PBB SHADOW E&M-EST. PATIENT-LVL I: CPT | Mod: PBBFAC,,, | Performed by: FAMILY MEDICINE

## 2018-09-28 PROCEDURE — 99211 OFF/OP EST MAY X REQ PHY/QHP: CPT | Mod: PBBFAC,PN | Performed by: FAMILY MEDICINE

## 2018-09-28 PROCEDURE — 99308 SBSQ NF CARE LOW MDM 20: CPT | Mod: S$PBB,,, | Performed by: FAMILY MEDICINE

## 2018-09-30 NOTE — PROGRESS NOTES
Subjective:     THIS DOCUMENT WAS MADE IN PART WITH [employer] DICTATION SOFTWARE. OCCASIONALLY THIS SOFTWARE MAY MISINTERPRET WORDS OR PHRASES.     Patient ID: Nahun Cohen is a 78 y.o. female.    Chief Complaint: routine external nursing home visit    Complete past medical history, medications, allergies, current vitals, and labs are available in the nursing home chart.  The nursing home chart is more up-to-date than the electronic medical record here.  If there are any discrepancies or questions, deferred the nursing home chart.    HPI      MCI (mild cognitive impairment) with memory loss   no acute concerns but she does appear to be gradually declining.     chronic bronchitis   stable, chronic cough and congestion but no recent exacerbation     phantom limb syndrome   as previously discussed. Appears stable    Active Ambulatory Problems     Diagnosis Date Noted    Endometrial ca 08/01/2012    Fatigue 08/01/2012    Dermographia 08/23/2012    Morbid obesity due to excess calories 07/09/2013    Venous insufficiency (chronic) (peripheral) 07/18/2013    CKD (chronic kidney disease), stage III 08/27/2013    Sciatica of right side 09/19/2013    Lumbar stenosis 10/15/2013    Thoracic, thoracolumbar and lumbosacral intervertebral disc disorder 10/15/2013    Allergic rhinitis 10/17/2013    S/P AKA (above knee amputation) 06/28/2014    Phantom limb syndrome with pain 08/29/2015    Depression 08/29/2015     Resolved Ambulatory Problems     Diagnosis Date Noted    DVT (deep venous thrombosis) 11/29/2012    DVT of popliteal vein 01/23/2013    Elevated liver enzymes 02/21/2013    DVT (deep venous thrombosis) 07/10/2013    Cellulitis and abscess of lower leg 12/17/2013    DVT (deep venous thrombosis) 12/17/2013     Past Medical History:   Diagnosis Date    Allergy     Arthritis     Endometrial ca     Thyroid disease        Review of Systems   Constitutional: Negative for unexpected weight change.    HENT: Negative for trouble swallowing.    Respiratory: Negative for cough, shortness of breath and wheezing.    Cardiovascular: Negative for chest pain.   Gastrointestinal: Negative for abdominal pain, nausea and vomiting.   Endocrine: Negative for polydipsia and polyuria.   Genitourinary: Negative for dysuria.   Musculoskeletal: Negative for arthralgias and gait problem.   Neurological: Positive for weakness and numbness. Negative for tremors, speech difficulty and headaches.   Psychiatric/Behavioral: Positive for sleep disturbance ( she complains of difficulty sleeping though sleeps most of the day). Negative for behavioral problems and hallucinations.       Objective:      Physical Exam   Constitutional: She appears well-developed and well-nourished. No distress.   HENT:   Head: Normocephalic and atraumatic.   Eyes: No scleral icterus.   Neck: No JVD present.   Cardiovascular: Normal rate, regular rhythm and normal heart sounds. Exam reveals no gallop and no friction rub.   No murmur heard.  2+edema and venous insufficiency changes left leg.  Status post right above the knee amputation   Pulmonary/Chest: Effort normal and breath sounds normal. No respiratory distress. She has no wheezes. She has no rales.   Abdominal: Soft. Bowel sounds are normal. She exhibits no distension and no mass. There is no tenderness. There is no guarding.   Neurological:   Sleeping quietly with headphones on, a routable but after brief discussion falls right back to sleep   Skin: She is not diaphoretic.   Vitals reviewed.      Assessment:       1. MCI (mild cognitive impairment) with memory loss    2. Chronic bronchitis, unspecified chronic bronchitis type    3. Depression, unspecified depression type    4. Phantom limb syndrome with pain        Plan:       Diagnoses and all orders for this visit:     relatively stable. Continue to focus on comfort care.

## 2018-11-01 RX ORDER — OXYCODONE AND ACETAMINOPHEN 5; 325 MG/1; MG/1
1 TABLET ORAL EVERY 4 HOURS PRN
Qty: 120 TABLET | Refills: 0 | Status: SHIPPED | OUTPATIENT
Start: 2018-11-01 | End: 2018-12-17 | Stop reason: SDUPTHER

## 2018-11-01 NOTE — TELEPHONE ENCOUNTER
Last seen on: 10/26/2018    Next appt: N/A    Last refill: 08/07/2018    Allergies:   Review of patient's allergies indicates:   Allergen Reactions    Clindamycin Rash     Generalized body rash    Lovenox [enoxaparin] Other (See Comments)    Adhes. band-tape-benzalkonium      Other reaction(s): Hives    Penicillins      Other reaction(s): Hives       Pharmacy: [facility]    Labs: Please review.    CMP  Sodium   Date Value Ref Range Status   10/16/2013 141 136 - 145 mmol/L Final     Potassium   Date Value Ref Range Status   10/21/2013 3.8 3.5 - 5.1 mmol/L Final     Chloride   Date Value Ref Range Status   10/16/2013 97 95 - 110 mmol/L Final     CO2   Date Value Ref Range Status   10/16/2013 29 23 - 29 mmol/L Final     Glucose   Date Value Ref Range Status   10/16/2013 124 (H) 70 - 110 mg/dL Final     BUN, Bld   Date Value Ref Range Status   10/16/2013 12 8 - 23 mg/dL Final     Creatinine   Date Value Ref Range Status   10/16/2013 1.0 0.5 - 1.4 mg/dL Final     Calcium   Date Value Ref Range Status   10/16/2013 9.7 8.7 - 10.5 mg/dL Final     Total Protein   Date Value Ref Range Status   09/11/2013 5.8 (L) 6.0 - 8.4 g/dL Final     Albumin   Date Value Ref Range Status   10/16/2013 3.5 3.5 - 5.2 g/dL Final     Total Bilirubin   Date Value Ref Range Status   09/11/2013 1.1 (H) 0.1 - 1.0 mg/dL Final     Comment:     For infants and newborns, interpretation of results should be based  on gestational age, weight and in agreement with clinical  observations.  Premature Infant recommended reference ranges:  Up to 24 hours.............<8.0 mg/dL  Up to 48 hours............<12.0 mg/dL  3-5 days..................<15.0 mg/dL  6-29 days.................<15.0 mg/dL     Alkaline Phosphatase   Date Value Ref Range Status   09/11/2013 80 55 - 135 U/L Final     AST   Date Value Ref Range Status   09/11/2013 29 10 - 40 U/L Final     ALT   Date Value Ref Range Status   09/11/2013 17 10 - 44 U/L Final     Anion Gap   Date Value  Ref Range Status   10/16/2013 15 8 - 16 mmol/L Final     eGFR if    Date Value Ref Range Status   10/16/2013 >60.0 >60 mL/min/1.73 m^2 Final     eGFR if non    Date Value Ref Range Status   10/16/2013 56.0 (A) >60 mL/min/1.73 m^2 Final     Comment:     Calculation used to obtain the estimated glomerular filtration  rate (eGFR) is the CKD-EPI equation. Since race is unknown   in our information system, the eGFR values for   -American and Non--American patients are given   for each creatinine result.       Lab Results   Component Value Date    TSH 4.049 (H) 09/06/2013       Lab Results   Component Value Date    WBC 7.09 10/16/2013    HGB 13.9 10/16/2013    HCT 43.4 10/16/2013    MCV 96 10/16/2013     10/16/2013         Please review! Thank you!

## 2018-12-17 RX ORDER — OXYCODONE AND ACETAMINOPHEN 5; 325 MG/1; MG/1
1 TABLET ORAL EVERY 4 HOURS PRN
Qty: 120 TABLET | Refills: 0 | Status: SHIPPED | OUTPATIENT
Start: 2018-12-17 | End: 2019-02-04 | Stop reason: SDUPTHER

## 2018-12-17 NOTE — TELEPHONE ENCOUNTER
Pt Of Arvin Ramirez MD    Last seen on: 04/24/18    Next appt: n/a     Last refill: 11/01/18    Allergies:   Review of patient's allergies indicates:   Allergen Reactions    Clindamycin Rash     Generalized body rash    Lovenox [enoxaparin] Other (See Comments)    Adhes. band-tape-benzalkonium      Other reaction(s): Hives    Penicillins      Other reaction(s): Hives       Pharmacy: [facility]   Fax: [phone]    Please review! Thank you!

## 2019-01-25 ENCOUNTER — OFFICE VISIT (OUTPATIENT)
Dept: FAMILY MEDICINE | Facility: CLINIC | Age: 79
End: 2019-01-25
Payer: MEDICARE

## 2019-01-25 DIAGNOSIS — N18.30 CKD (CHRONIC KIDNEY DISEASE), STAGE III: ICD-10-CM

## 2019-01-25 DIAGNOSIS — F32.A DEPRESSION, UNSPECIFIED DEPRESSION TYPE: ICD-10-CM

## 2019-01-25 DIAGNOSIS — J42 CHRONIC BRONCHITIS, UNSPECIFIED CHRONIC BRONCHITIS TYPE: ICD-10-CM

## 2019-01-25 DIAGNOSIS — G31.84 MCI (MILD COGNITIVE IMPAIRMENT) WITH MEMORY LOSS: ICD-10-CM

## 2019-01-25 DIAGNOSIS — Z89.611 STATUS POST ABOVE KNEE AMPUTATION OF RIGHT LOWER EXTREMITY: ICD-10-CM

## 2019-01-25 DIAGNOSIS — G54.6 PHANTOM LIMB SYNDROME WITH PAIN: Primary | ICD-10-CM

## 2019-01-25 PROCEDURE — 99999 PR PBB SHADOW E&M-EST. PATIENT-LVL I: CPT | Mod: PBBFAC,,, | Performed by: FAMILY MEDICINE

## 2019-01-25 PROCEDURE — 99999 PR PBB SHADOW E&M-EST. PATIENT-LVL I: ICD-10-PCS | Mod: PBBFAC,,, | Performed by: FAMILY MEDICINE

## 2019-01-25 PROCEDURE — 99308 SBSQ NF CARE LOW MDM 20: CPT | Mod: S$PBB,,, | Performed by: FAMILY MEDICINE

## 2019-01-25 PROCEDURE — 99308 PR NURSING FAC CARE, SUBSEQ, MINOR COMPLIC: ICD-10-PCS | Mod: S$PBB,,, | Performed by: FAMILY MEDICINE

## 2019-01-25 PROCEDURE — 99211 OFF/OP EST MAY X REQ PHY/QHP: CPT | Mod: PBBFAC,PN | Performed by: FAMILY MEDICINE

## 2019-01-27 NOTE — PROGRESS NOTES
THIS DOCUMENT WAS MADE IN PART WITH VOICE RECOGNITION SOFTWARE.  OCCASIONALLY THIS SOFTWARE WILL MISINTERPRET WORDS OR PHRASES.      Nahun Cohen  1940    routine external nursing home visit    Complete past medical history, medications, allergies, current vitals, and labs are available in the nursing home chart.  The nursing home chart is more up-to-date than the electronic medical record here.  If there are any discrepancies or questions, deferred the nursing home chart.    Diagnoses and all orders for this visit:    Phantom limb syndrome with pain  Status post above knee amputation of right lower extremity  She takes gabapentin.  The staff applies Voltaren topically.  We tried compression but she does not tolerate that very well.  She is a poor candidate for prosthesis and has failed attempts in the past.  The Lidoderm patch work the best but apparently is cost prohibitive by the facility.  I advised her nurse that they could try an Ace bandage which allows them to adjust the compression though they are doubtful she will tolerated they will try.  Must continue try to redirect her, and educator but this fails because of her memory loss.    MCI (mild cognitive impairment) with memory loss  Gradually worsening    Depression, unspecified depression type  This is fairly stable but exacerbated by the above conditions times.    Chronic bronchitis, unspecified chronic bronchitis type  Relatively stable    CKD (chronic kidney disease), stage III  Stable        Subjective         HPI  Routine visit.  Primary problems continues to be phantom limb syndrome.  This is exacerbated the fact that she does not remember what the and myself help her.  This is exacerbated by her cognitive impairment, progressing dementia.  Her complaint today is that she needs help taking the issue of her right foot.  I am unable to get her to comprehend that she has had an amputation.  She says she knows the foot is there.  When I ask  her to take a look she refuses to take a look think she can't look down that far.  This obviously is deeper than superficial memory loss.    HPI elements addressed above in the assessment and plan including problems, diagnosis, stability/instability,  improving/worsening, and chronicity will not be duplicated in this section. Any important additional HPI topics will be discussed here if needed.    Active Ambulatory Problems     Diagnosis Date Noted    Endometrial ca 08/01/2012    Fatigue 08/01/2012    Dermographia 08/23/2012    Morbid obesity due to excess calories 07/09/2013    Venous insufficiency (chronic) (peripheral) 07/18/2013    CKD (chronic kidney disease), stage III 08/27/2013    Sciatica of right side 09/19/2013    Lumbar stenosis 10/15/2013    Thoracic, thoracolumbar and lumbosacral intervertebral disc disorder 10/15/2013    Allergic rhinitis 10/17/2013    S/P AKA (above knee amputation) 06/28/2014    Phantom limb syndrome with pain 08/29/2015    Depression 08/29/2015     Resolved Ambulatory Problems     Diagnosis Date Noted    DVT (deep venous thrombosis) 11/29/2012    DVT of popliteal vein 01/23/2013    Elevated liver enzymes 02/21/2013    DVT (deep venous thrombosis) 07/10/2013    Cellulitis and abscess of lower leg 12/17/2013    DVT (deep venous thrombosis) 12/17/2013     Past Medical History:   Diagnosis Date    Allergy     Arthritis     Endometrial ca     Thyroid disease          Review of Systems   Constitutional: Negative for unexpected weight change.   HENT: Negative for trouble swallowing.    Respiratory: Positive for cough ( Occasional dry cough). Negative for shortness of breath and wheezing.    Cardiovascular: Negative for chest pain.   Gastrointestinal: Negative for abdominal pain, nausea and vomiting.   Endocrine: Negative for polydipsia and polyuria.   Genitourinary: Negative for dysuria.   Musculoskeletal: Negative for arthralgias and gait problem.   Neurological:  Positive for weakness and numbness. Negative for tremors, speech difficulty and headaches.   Psychiatric/Behavioral: Negative for suicidal ideas.       Objective     Physical Exam   Constitutional: She appears well-developed and well-nourished. No distress.   HENT:   Head: Normocephalic and atraumatic.   Eyes: No scleral icterus.   Neck: No JVD present.   Cardiovascular: Normal rate, regular rhythm and normal heart sounds. Exam reveals no gallop and no friction rub.   No murmur heard.  2+edema and venous insufficiency changes left leg.  Status post right above the knee amputation   Pulmonary/Chest: Effort normal and breath sounds normal. No respiratory distress. She has no wheezes. She has no rales.   Neurological: She is alert.   She is awake pleasant but forgetful   Skin: She is not diaphoretic.   Vitals reviewed.        MOST RECENT LABS IN OUR ELECTRONIC MEDICAL RECORD:   Most recent labs and xrays reviewed in nursing home chart.

## 2019-02-04 RX ORDER — OXYCODONE AND ACETAMINOPHEN 5; 325 MG/1; MG/1
1 TABLET ORAL EVERY 4 HOURS PRN
Qty: 120 TABLET | Refills: 0 | Status: SHIPPED | OUTPATIENT
Start: 2019-02-04 | End: 2019-03-27 | Stop reason: SDUPTHER

## 2019-02-22 ENCOUNTER — OFFICE VISIT (OUTPATIENT)
Dept: FAMILY MEDICINE | Facility: CLINIC | Age: 79
End: 2019-02-22
Payer: MEDICARE

## 2019-02-22 DIAGNOSIS — G54.6 PHANTOM LIMB SYNDROME WITH PAIN: ICD-10-CM

## 2019-02-22 DIAGNOSIS — Z89.611 STATUS POST ABOVE KNEE AMPUTATION OF RIGHT LOWER EXTREMITY: ICD-10-CM

## 2019-02-22 DIAGNOSIS — G31.84 MCI (MILD COGNITIVE IMPAIRMENT) WITH MEMORY LOSS: Primary | ICD-10-CM

## 2019-02-22 DIAGNOSIS — E66.01 MORBID OBESITY DUE TO EXCESS CALORIES: ICD-10-CM

## 2019-02-22 DIAGNOSIS — F32.A DEPRESSION, UNSPECIFIED DEPRESSION TYPE: ICD-10-CM

## 2019-02-22 PROCEDURE — 99999 PR PBB SHADOW E&M-EST. PATIENT-LVL I: CPT | Mod: PBBFAC,,, | Performed by: FAMILY MEDICINE

## 2019-02-22 PROCEDURE — 99308 PR NURSING FAC CARE, SUBSEQ, MINOR COMPLIC: ICD-10-PCS | Mod: S$PBB,,, | Performed by: FAMILY MEDICINE

## 2019-02-22 PROCEDURE — 99211 OFF/OP EST MAY X REQ PHY/QHP: CPT | Mod: PBBFAC,PN | Performed by: FAMILY MEDICINE

## 2019-02-22 PROCEDURE — 99999 PR PBB SHADOW E&M-EST. PATIENT-LVL I: ICD-10-PCS | Mod: PBBFAC,,, | Performed by: FAMILY MEDICINE

## 2019-02-22 PROCEDURE — 99308 SBSQ NF CARE LOW MDM 20: CPT | Mod: S$PBB,,, | Performed by: FAMILY MEDICINE

## 2019-02-24 NOTE — PROGRESS NOTES
THIS DOCUMENT WAS MADE IN PART WITH VOICE RECOGNITION SOFTWARE.  OCCASIONALLY THIS SOFTWARE WILL MISINTERPRET WORDS OR PHRASES.      Nahun Cohen  1940    routine external nursing home visit    Complete past medical history, medications, allergies, current vitals, and labs are available in the nursing home chart.  The nursing home chart is more up-to-date than the electronic medical record here.  If there are any discrepancies or questions, deferred the nursing home chart.    Diagnoses and all orders for this visit:    MCI (mild cognitive impairment) with memory loss   relatively stable.    Phantom limb syndrome with pain   Stable but persistent, exacerbated by her memory loss    Depression, unspecified depression type    The stable,  Reasonable control    Morbid obesity due to excess calories   stable    Status post above knee amputation of right lower extremity   stable      Subjective         HPI   routine nursing home evaluation.  Discussed with nurse and spoke with the patient. No acute concerns    HPI elements addressed above in the assessment and plan including problems, diagnosis, stability/instability,  improving/worsening, and chronicity will not be duplicated in this section. Any important additional HPI topics will be discussed here if needed.    Active Ambulatory Problems     Diagnosis Date Noted    Endometrial ca 08/01/2012    Fatigue 08/01/2012    Dermographia 08/23/2012    Morbid obesity due to excess calories 07/09/2013    Venous insufficiency (chronic) (peripheral) 07/18/2013    CKD (chronic kidney disease), stage III 08/27/2013    Sciatica of right side 09/19/2013    Lumbar stenosis 10/15/2013    Thoracic, thoracolumbar and lumbosacral intervertebral disc disorder 10/15/2013    Allergic rhinitis 10/17/2013    S/P AKA (above knee amputation) 06/28/2014    Phantom limb syndrome with pain 08/29/2015    Depression 08/29/2015     Resolved Ambulatory Problems     Diagnosis  Date Noted    DVT (deep venous thrombosis) 11/29/2012    DVT of popliteal vein 01/23/2013    Elevated liver enzymes 02/21/2013    DVT (deep venous thrombosis) 07/10/2013    Cellulitis and abscess of lower leg 12/17/2013    DVT (deep venous thrombosis) 12/17/2013     Past Medical History:   Diagnosis Date    Allergy     Arthritis     Endometrial ca     Thyroid disease          Review of Systems   Constitutional: Positive for fatigue. Negative for activity change.   Respiratory: Negative for shortness of breath.    Cardiovascular: Negative for chest pain.   Neurological: Positive for numbness.       Objective     Physical Exam   Constitutional: She appears well-developed and well-nourished. No distress.   HENT:   Head: Normocephalic and atraumatic.   Eyes: No scleral icterus.   Neck: No JVD present.   Cardiovascular: Normal rate, regular rhythm and normal heart sounds. Exam reveals no gallop and no friction rub.   No murmur heard.  2+edema and venous insufficiency changes left leg.  Status post right above the knee amputation   Pulmonary/Chest: Effort normal and breath sounds normal. No respiratory distress. She has no wheezes. She has no rales.   Neurological: She is alert.   Skin: She is not diaphoretic.   Vitals reviewed.        MOST RECENT LABS IN OUR ELECTRONIC MEDICAL RECORD:   Most recent labs and xrays reviewed in nursing home chart.

## 2019-03-27 RX ORDER — OXYCODONE AND ACETAMINOPHEN 5; 325 MG/1; MG/1
1 TABLET ORAL EVERY 4 HOURS PRN
Qty: 120 TABLET | Refills: 0 | Status: SHIPPED | OUTPATIENT
Start: 2019-03-27 | End: 2019-05-13 | Stop reason: SDUPTHER

## 2019-03-29 ENCOUNTER — OFFICE VISIT (OUTPATIENT)
Dept: FAMILY MEDICINE | Facility: CLINIC | Age: 79
End: 2019-03-29
Payer: MEDICARE

## 2019-03-29 DIAGNOSIS — N18.30 CKD (CHRONIC KIDNEY DISEASE), STAGE III: ICD-10-CM

## 2019-03-29 DIAGNOSIS — J42 CHRONIC BRONCHITIS, UNSPECIFIED CHRONIC BRONCHITIS TYPE: Primary | ICD-10-CM

## 2019-03-29 DIAGNOSIS — G31.84 MCI (MILD COGNITIVE IMPAIRMENT) WITH MEMORY LOSS: ICD-10-CM

## 2019-03-29 DIAGNOSIS — F32.A DEPRESSION, UNSPECIFIED DEPRESSION TYPE: ICD-10-CM

## 2019-03-29 DIAGNOSIS — G54.6 PHANTOM LIMB SYNDROME WITH PAIN: ICD-10-CM

## 2019-03-29 PROCEDURE — 99999 PR PBB SHADOW E&M-EST. PATIENT-LVL I: CPT | Mod: PBBFAC,,, | Performed by: FAMILY MEDICINE

## 2019-03-29 PROCEDURE — 99308 PR NURSING FAC CARE, SUBSEQ, MINOR COMPLIC: ICD-10-PCS | Mod: S$PBB,,, | Performed by: FAMILY MEDICINE

## 2019-03-29 PROCEDURE — 99999 PR PBB SHADOW E&M-EST. PATIENT-LVL I: ICD-10-PCS | Mod: PBBFAC,,, | Performed by: FAMILY MEDICINE

## 2019-03-29 PROCEDURE — 99308 SBSQ NF CARE LOW MDM 20: CPT | Mod: S$PBB,,, | Performed by: FAMILY MEDICINE

## 2019-03-29 PROCEDURE — 99211 OFF/OP EST MAY X REQ PHY/QHP: CPT | Mod: PBBFAC,PN | Performed by: FAMILY MEDICINE

## 2019-03-31 NOTE — PROGRESS NOTES
THIS DOCUMENT WAS MADE IN PART WITH VOICE RECOGNITION SOFTWARE.  OCCASIONALLY THIS SOFTWARE WILL MISINTERPRET WORDS OR PHRASES.      Nahun Cohen  1940    routine external nursing home visit    Complete past medical history, medications, allergies, current vitals, and labs are available in the nursing home chart.  The nursing home chart is more up-to-date than the electronic medical record here.  If there are any discrepancies or questions, deferred the nursing home chart.    Diagnoses and all orders for this visit:    Chronic bronchitis, unspecified chronic bronchitis type   stable with no recent exacerbations    MCI (mild cognitive impairment) with memory loss   this appears relatively stable    Depression, unspecified depression type   this has been reasonably stable but there has been some things I changes but this appears to have been worked out    CKD (chronic kidney disease), stage III   stable    Phantom limb syndrome with pain    Appears mostly stable but prone to exacerbations      Subjective         HPI     routine nursing home follow-up. All details discussed above    Active Ambulatory Problems     Diagnosis Date Noted    Endometrial ca 08/01/2012    Fatigue 08/01/2012    Dermographia 08/23/2012    Morbid obesity due to excess calories 07/09/2013    Venous insufficiency (chronic) (peripheral) 07/18/2013    CKD (chronic kidney disease), stage III 08/27/2013    Sciatica of right side 09/19/2013    Lumbar stenosis 10/15/2013    Thoracic, thoracolumbar and lumbosacral intervertebral disc disorder 10/15/2013    Allergic rhinitis 10/17/2013    S/P AKA (above knee amputation) 06/28/2014    Phantom limb syndrome with pain 08/29/2015    Depression 08/29/2015     Resolved Ambulatory Problems     Diagnosis Date Noted    DVT (deep venous thrombosis) 11/29/2012    DVT of popliteal vein 01/23/2013    Elevated liver enzymes 02/21/2013    DVT (deep venous thrombosis) 07/10/2013     Cellulitis and abscess of lower leg 12/17/2013    DVT (deep venous thrombosis) 12/17/2013     Past Medical History:   Diagnosis Date    Allergy     Arthritis     Endometrial ca     Thyroid disease          Review of Systems   Constitutional: Positive for fatigue. Negative for activity change.   Respiratory: Negative for shortness of breath.    Cardiovascular: Negative for chest pain.   Gastrointestinal: Negative.    Neurological: Positive for weakness and numbness.   Psychiatric/Behavioral: The patient is nervous/anxious.        Objective     Physical Exam   Constitutional: She appears well-developed and well-nourished. No distress.   HENT:   Head: Normocephalic and atraumatic.   Eyes: Conjunctivae are normal. No scleral icterus.   Neck: No JVD present.   Cardiovascular: Normal rate, regular rhythm and normal heart sounds.   No murmur heard.  2+edema and venous insufficiency changes left leg.  Status post right above the knee amputation   Pulmonary/Chest: Effort normal and breath sounds normal. No respiratory distress. She has no wheezes. She has no rales.   Abdominal: Soft. Bowel sounds are normal. She exhibits no distension.   Neurological: She is alert.   Skin: She is not diaphoretic.   Vitals reviewed.        MOST RECENT LABS IN OUR ELECTRONIC MEDICAL RECORD:   Most recent labs and xrays reviewed in nursing home chart.

## 2019-05-13 ENCOUNTER — TELEPHONE (OUTPATIENT)
Dept: FAMILY MEDICINE | Facility: CLINIC | Age: 79
End: 2019-05-13

## 2019-05-13 RX ORDER — OXYCODONE AND ACETAMINOPHEN 5; 325 MG/1; MG/1
1 TABLET ORAL EVERY 4 HOURS PRN
Qty: 120 TABLET | Refills: 0 | Status: SHIPPED | OUTPATIENT
Start: 2019-05-13 | End: 2019-06-26 | Stop reason: SDUPTHER

## 2019-05-13 NOTE — TELEPHONE ENCOUNTER
Rec'd fax from [facility] requesting hardscript for Percocet 5-325, 1 PO q4hr PRN pain, be faxed to [facility] at [phone]. If approved, please print and we will fax.

## 2019-06-26 NOTE — TELEPHONE ENCOUNTER
"We received an incoming fax today from [facility] on behalf of the patient. Fax states "Would you please send Rx for ves. Percocet 5-325mg p.o. q 4hrs PRN pain to [facility]. She is almost out."   "

## 2019-06-27 RX ORDER — OXYCODONE AND ACETAMINOPHEN 5; 325 MG/1; MG/1
1 TABLET ORAL EVERY 4 HOURS PRN
Qty: 120 TABLET | Refills: 0 | Status: SHIPPED | OUTPATIENT
Start: 2019-06-27 | End: 2019-08-05 | Stop reason: SDUPTHER

## 2019-08-05 RX ORDER — OXYCODONE AND ACETAMINOPHEN 5; 325 MG/1; MG/1
1 TABLET ORAL EVERY 4 HOURS PRN
Qty: 120 TABLET | Refills: 0 | Status: SHIPPED | OUTPATIENT
Start: 2019-08-05 | End: 2019-08-06 | Stop reason: SDUPTHER

## 2019-08-06 RX ORDER — OXYCODONE AND ACETAMINOPHEN 5; 325 MG/1; MG/1
1 TABLET ORAL EVERY 4 HOURS PRN
Qty: 120 TABLET | Refills: 0 | Status: SHIPPED | OUTPATIENT
Start: 2019-08-06 | End: 2019-09-12 | Stop reason: SDUPTHER

## 2019-08-06 NOTE — TELEPHONE ENCOUNTER
----- Message from RT Madhu sent at 8/6/2019  1:11 PM CDT -----  Contact: DAVID Garduno, [phone] Sae Garduno LPN, [phone] Ashley Morales, please send pt's prescription for the percocet to [facility], pt is currently out, Please Expedited, if possible, thanks.

## 2019-09-12 RX ORDER — OXYCODONE AND ACETAMINOPHEN 5; 325 MG/1; MG/1
1 TABLET ORAL EVERY 4 HOURS PRN
Qty: 120 TABLET | Refills: 0 | Status: SHIPPED | OUTPATIENT
Start: 2019-09-12 | End: 2019-10-24 | Stop reason: SDUPTHER

## 2019-09-27 ENCOUNTER — OFFICE VISIT (OUTPATIENT)
Dept: FAMILY MEDICINE | Facility: CLINIC | Age: 79
End: 2019-09-27
Payer: MEDICARE

## 2019-09-27 DIAGNOSIS — F32.A DEPRESSION, UNSPECIFIED DEPRESSION TYPE: ICD-10-CM

## 2019-09-27 DIAGNOSIS — J42 CHRONIC BRONCHITIS, UNSPECIFIED CHRONIC BRONCHITIS TYPE: ICD-10-CM

## 2019-09-27 DIAGNOSIS — N18.30 CKD (CHRONIC KIDNEY DISEASE), STAGE III: ICD-10-CM

## 2019-09-27 DIAGNOSIS — G31.84 MCI (MILD COGNITIVE IMPAIRMENT) WITH MEMORY LOSS: Primary | ICD-10-CM

## 2019-09-27 PROCEDURE — 99308 SBSQ NF CARE LOW MDM 20: CPT | Mod: S$PBB,,, | Performed by: FAMILY MEDICINE

## 2019-09-27 PROCEDURE — 99999 PR PBB SHADOW E&M-EST. PATIENT-LVL I: CPT | Mod: PBBFAC,,, | Performed by: FAMILY MEDICINE

## 2019-09-27 PROCEDURE — 99211 OFF/OP EST MAY X REQ PHY/QHP: CPT | Mod: PBBFAC,PN | Performed by: FAMILY MEDICINE

## 2019-09-27 PROCEDURE — 99308 PR NURSING FAC CARE, SUBSEQ, MINOR COMPLIC: ICD-10-PCS | Mod: S$PBB,,, | Performed by: FAMILY MEDICINE

## 2019-09-27 PROCEDURE — 99999 PR PBB SHADOW E&M-EST. PATIENT-LVL I: ICD-10-PCS | Mod: PBBFAC,,, | Performed by: FAMILY MEDICINE

## 2019-09-29 NOTE — PROGRESS NOTES
THIS DOCUMENT WAS MADE IN PART WITH VOICE RECOGNITION SOFTWARE.  OCCASIONALLY THIS SOFTWARE WILL MISINTERPRET WORDS OR PHRASES.      Nahun Cohen  1940    routine external nursing home visit   facility: [facility]    Complete past medical history, medications, allergies, current vitals, and labs are available in the nursing home chart.  The nursing home chart is more up-to-date than the electronic medical record here.  If there are any discrepancies or questions, deferred the nursing home chart.    Diagnoses and all orders for this visit:    MCI (mild cognitive impairment) with memory loss   worsening signs of cognitive impairment and dementia. She does appear to have hallucinations for her nurse. But they are  manageable.    Depression, unspecified depression type   This appears stable    Chronic bronchitis, unspecified chronic bronchitis type   stable exacerbations    CKD (chronic kidney disease), stage III  This is a chronic condition.  This condition has been reviewed and evaluated and it is currently stable.          Subjective         HPI     routine nursing home follow-up. All details discussed above    Active Ambulatory Problems     Diagnosis Date Noted    Endometrial ca 08/01/2012    Fatigue 08/01/2012    Dermographia 08/23/2012    Morbid obesity due to excess calories 07/09/2013    Venous insufficiency (chronic) (peripheral) 07/18/2013    CKD (chronic kidney disease), stage III 08/27/2013    Sciatica of right side 09/19/2013    Lumbar stenosis 10/15/2013    Thoracic, thoracolumbar and lumbosacral intervertebral disc disorder 10/15/2013    Allergic rhinitis 10/17/2013    S/P AKA (above knee amputation) 06/28/2014    Phantom limb syndrome with pain 08/29/2015    Depression 08/29/2015     Resolved Ambulatory Problems     Diagnosis Date Noted    DVT (deep venous thrombosis) 11/29/2012    DVT of popliteal vein 01/23/2013    Elevated liver enzymes 02/21/2013     DVT (deep venous thrombosis) 07/10/2013    Cellulitis and abscess of lower leg 12/17/2013    DVT (deep venous thrombosis) 12/17/2013     Past Medical History:   Diagnosis Date    Allergy     Arthritis     Thyroid disease          Review of Systems   Constitutional: Positive for fatigue.   Respiratory: Negative for shortness of breath.    Cardiovascular: Negative for chest pain.   Gastrointestinal: Negative.    Neurological: Positive for weakness and numbness.   Psychiatric/Behavioral: Positive for confusion and hallucinations.       Objective     Physical Exam   Constitutional: No distress.   Cardiovascular:   No murmur heard.  Pulmonary/Chest: Effort normal. No respiratory distress.   Abdominal: She exhibits no distension.   Vitals reviewed.  patient examined while sleeping., As discussed with others. Patient not awakened today but there's no distress

## 2019-10-24 RX ORDER — OXYCODONE AND ACETAMINOPHEN 5; 325 MG/1; MG/1
1 TABLET ORAL EVERY 4 HOURS PRN
Qty: 120 TABLET | Refills: 0 | Status: SHIPPED | OUTPATIENT
Start: 2019-10-24 | End: 2019-12-09 | Stop reason: SDUPTHER

## 2019-10-24 NOTE — TELEPHONE ENCOUNTER
----- Message from Ashley Marie sent at 10/24/2019  3:16 PM CDT -----  Contact: jeff with jackelin crawley pH# [phone]  jeff with jackelin crawley ph# [phone]  Requesting refill on percocet   Send it to [facility]   Refill request initially sent on 10/21/19   Patient will be out of medication by 10/25/19

## 2019-10-25 ENCOUNTER — OFFICE VISIT (OUTPATIENT)
Dept: FAMILY MEDICINE | Facility: CLINIC | Age: 79
End: 2019-10-25
Payer: MEDICARE

## 2019-10-25 DIAGNOSIS — G31.84 MCI (MILD COGNITIVE IMPAIRMENT) WITH MEMORY LOSS: ICD-10-CM

## 2019-10-25 DIAGNOSIS — G54.6 PHANTOM LIMB SYNDROME WITH PAIN: Primary | ICD-10-CM

## 2019-10-25 DIAGNOSIS — J42 CHRONIC BRONCHITIS, UNSPECIFIED CHRONIC BRONCHITIS TYPE: ICD-10-CM

## 2019-10-25 PROCEDURE — 99999 PR PBB SHADOW E&M-EST. PATIENT-LVL II: ICD-10-PCS | Mod: PBBFAC,,, | Performed by: FAMILY MEDICINE

## 2019-10-25 PROCEDURE — 99212 OFFICE O/P EST SF 10 MIN: CPT | Mod: PBBFAC,PN | Performed by: FAMILY MEDICINE

## 2019-10-25 PROCEDURE — 99999 PR PBB SHADOW E&M-EST. PATIENT-LVL II: CPT | Mod: PBBFAC,,, | Performed by: FAMILY MEDICINE

## 2019-10-25 PROCEDURE — 99308 SBSQ NF CARE LOW MDM 20: CPT | Mod: S$PBB,,, | Performed by: FAMILY MEDICINE

## 2019-10-25 PROCEDURE — 99308 PR NURSING FAC CARE, SUBSEQ, MINOR COMPLIC: ICD-10-PCS | Mod: S$PBB,,, | Performed by: FAMILY MEDICINE

## 2019-11-29 ENCOUNTER — OFFICE VISIT (OUTPATIENT)
Dept: FAMILY MEDICINE | Facility: CLINIC | Age: 79
End: 2019-11-29
Payer: MEDICARE

## 2019-11-29 DIAGNOSIS — G54.6 PHANTOM LIMB SYNDROME WITH PAIN: ICD-10-CM

## 2019-11-29 DIAGNOSIS — Z89.611 STATUS POST ABOVE-KNEE AMPUTATION OF RIGHT LOWER EXTREMITY: ICD-10-CM

## 2019-11-29 DIAGNOSIS — F32.A DEPRESSION, UNSPECIFIED DEPRESSION TYPE: ICD-10-CM

## 2019-11-29 DIAGNOSIS — G31.84 MCI (MILD COGNITIVE IMPAIRMENT) WITH MEMORY LOSS: Primary | ICD-10-CM

## 2019-11-29 DIAGNOSIS — J42 CHRONIC BRONCHITIS, UNSPECIFIED CHRONIC BRONCHITIS TYPE: ICD-10-CM

## 2019-11-29 PROCEDURE — 99999 PR PBB SHADOW E&M-EST. PATIENT-LVL II: CPT | Mod: PBBFAC,,, | Performed by: FAMILY MEDICINE

## 2019-11-29 PROCEDURE — 99308 SBSQ NF CARE LOW MDM 20: CPT | Mod: S$PBB,,, | Performed by: FAMILY MEDICINE

## 2019-11-29 PROCEDURE — 99212 OFFICE O/P EST SF 10 MIN: CPT | Mod: PBBFAC,PN | Performed by: FAMILY MEDICINE

## 2019-11-29 PROCEDURE — 99308 PR NURSING FAC CARE, SUBSEQ, MINOR COMPLIC: ICD-10-PCS | Mod: S$PBB,,, | Performed by: FAMILY MEDICINE

## 2019-11-29 PROCEDURE — 99999 PR PBB SHADOW E&M-EST. PATIENT-LVL II: ICD-10-PCS | Mod: PBBFAC,,, | Performed by: FAMILY MEDICINE

## 2019-11-29 NOTE — PROGRESS NOTES
THIS DOCUMENT WAS MADE IN PART WITH VOICE RECOGNITION SOFTWARE.  OCCASIONALLY THIS SOFTWARE WILL MISINTERPRET WORDS OR PHRASES.      Nahun Cohen  1940    routine external nursing home visit   [facility]    Complete past medical history, medications, allergies, current vitals, and labs are available in the nursing home chart.  The nursing home chart is more up-to-date than the electronic medical record here.  If there are any discrepancies or questions, deferred the nursing home chart.    Diagnoses and all orders for this visit:    MCI (mild cognitive impairment) with memory loss  This is a chronic condition.  This condition has been reviewed and evaluated and it is currently stable.    Depression, unspecified depression type   Overall improved    Chronic bronchitis, unspecified chronic bronchitis type   this has been very stable and no recent exacerbation    Phantom limb syndrome with pain  Status post above-knee amputation of right lower extremity   current symptoms are controlled, she did not voice any complaints today      Subjective         HPI     routine visit. She is rather cheerful today. She does not report any acute changes or new concerns.  HPI elements addressed above in the assessment and plan including problems, diagnosis, stability/instability,  improving/worsening, and chronicity will not be duplicated in this section. Any important additional HPI topics will be discussed here if needed.    Active Ambulatory Problems     Diagnosis Date Noted    Endometrial ca 08/01/2012    Fatigue 08/01/2012    Dermographia 08/23/2012    Morbid obesity due to excess calories 07/09/2013    Venous insufficiency (chronic) (peripheral) 07/18/2013    CKD (chronic kidney disease), stage III 08/27/2013    Sciatica of right side 09/19/2013    Lumbar stenosis 10/15/2013    Thoracic, thoracolumbar and lumbosacral intervertebral disc disorder 10/15/2013    Allergic rhinitis 10/17/2013    S/P AKA  (above knee amputation) 06/28/2014    Phantom limb syndrome with pain 08/29/2015    Depression 08/29/2015     Resolved Ambulatory Problems     Diagnosis Date Noted    DVT (deep venous thrombosis) 11/29/2012    DVT of popliteal vein 01/23/2013    Elevated liver enzymes 02/21/2013    DVT (deep venous thrombosis) 07/10/2013    Cellulitis and abscess of lower leg 12/17/2013    DVT (deep venous thrombosis) 12/17/2013     Past Medical History:   Diagnosis Date    Allergy     Arthritis     Thyroid disease          Review of Systems   Respiratory: Negative for shortness of breath.    Cardiovascular: Negative for chest pain.   Gastrointestinal: Negative.    Neurological: Positive for weakness and numbness.   Psychiatric/Behavioral: Negative for dysphoric mood and suicidal ideas.       Objective     Physical Exam   Constitutional: No distress.   Obese  female   Cardiovascular: Normal rate and regular rhythm.   No murmur heard.  Pulmonary/Chest: Effort normal. No respiratory distress.   Abdominal: Soft. She exhibits no distension. There is no tenderness.   Vitals reviewed.

## 2019-12-09 RX ORDER — OXYCODONE AND ACETAMINOPHEN 5; 325 MG/1; MG/1
1 TABLET ORAL EVERY 4 HOURS PRN
Qty: 120 TABLET | Refills: 0 | Status: SHIPPED | OUTPATIENT
Start: 2019-12-09 | End: 2020-01-20 | Stop reason: SDUPTHER

## 2019-12-09 NOTE — TELEPHONE ENCOUNTER
----- Message from Kalpesh Scales sent at 12/9/2019  4:14 PM CST -----  Type:  RX Refill Request    Who Called:  Berkley/Sae Melton  Refill or New Rx:  Refill  RX Name and Strength:  oxyCODONE-acetaminophen (PERCOCET) 5-325 mg per tablet       How is the patient currently taking it? (ex. 1XDay):  1 Tablet every 8 hour  Is this a 30 day or 90 day RX: 30  Preferred Pharmacy with phone number:    [facility] [place] [address] [facility] [address]  Phone: [phone] Fax: [phone]    Local or Mail Order:  Local  Ordering Provider:  Ashley  Best Call Back Number:  [phone]  Additional Information:

## 2019-12-27 ENCOUNTER — OFFICE VISIT (OUTPATIENT)
Dept: FAMILY MEDICINE | Facility: CLINIC | Age: 79
End: 2019-12-27
Payer: MEDICARE

## 2019-12-27 DIAGNOSIS — J42 CHRONIC BRONCHITIS, UNSPECIFIED CHRONIC BRONCHITIS TYPE: ICD-10-CM

## 2019-12-27 DIAGNOSIS — G31.84 MCI (MILD COGNITIVE IMPAIRMENT) WITH MEMORY LOSS: Primary | ICD-10-CM

## 2019-12-27 DIAGNOSIS — G54.6 PHANTOM LIMB SYNDROME WITH PAIN: ICD-10-CM

## 2019-12-27 DIAGNOSIS — F32.A DEPRESSION, UNSPECIFIED DEPRESSION TYPE: ICD-10-CM

## 2019-12-27 PROCEDURE — 99999 PR PBB SHADOW E&M-EST. PATIENT-LVL II: ICD-10-PCS | Mod: PBBFAC,,, | Performed by: FAMILY MEDICINE

## 2019-12-27 PROCEDURE — 99999 PR PBB SHADOW E&M-EST. PATIENT-LVL II: CPT | Mod: PBBFAC,,, | Performed by: FAMILY MEDICINE

## 2019-12-27 PROCEDURE — 99309 PR NURSING FAC CARE, SUBSEQ, SIGNIF COMPLIC: ICD-10-PCS | Mod: S$PBB,,, | Performed by: FAMILY MEDICINE

## 2019-12-27 PROCEDURE — 99212 OFFICE O/P EST SF 10 MIN: CPT | Mod: PBBFAC,PN | Performed by: FAMILY MEDICINE

## 2019-12-27 PROCEDURE — 99309 SBSQ NF CARE MODERATE MDM 30: CPT | Mod: S$PBB,,, | Performed by: FAMILY MEDICINE

## 2019-12-29 NOTE — PROGRESS NOTES
THIS DOCUMENT WAS MADE IN PART WITH VOICE RECOGNITION SOFTWARE.  OCCASIONALLY THIS SOFTWARE WILL MISINTERPRET WORDS OR PHRASES.      Nahun Cohen  1940     routine external nursing home visit   [facility]    Complete past medical history, medications, allergies, current vitals, and labs are available in the nursing home chart.  The nursing home chart is more up-to-date than the electronic medical record here.  If there are any discrepancies or questions, deferred the nursing home chart.      Diagnoses and all orders for this visit:    MCI (mild cognitive impairment) with memory loss   there does appear to be some progression here. More confusion. Possible hallucinations. But she seems comfortable and there does not appear to be any severe anxiety associated with this nor behavioral problems. If I don't recommend any sedating medications at this point but will monitor    Depression, unspecified depression type   she denies feeling down or depressed, overall this appears stable continue current medications    Chronic bronchitis, unspecified chronic bronchitis type   stable the recent exacerbation    Phantom limb syndrome with pain   persistent but stable    labs scanned directly into epic. Hga1c remained stable in a prediabetic range. Protein levels are low. Nutrition status needs to be adjusted. Calcium low but our reflection of albumin.,added a protein supplement, requested low sugar version. To add an additional 20-30 g of protein daily     total time today including counseling, exam, and chart review was 30+ minutes. More than 15 minutes was spent face-to-face counseling with the patient.    Subjective      Routine nursing home follow-up    HPI   Routine nursing home follow-up. Staff reports that she has been more confused lately. Sleeps most of the day up at night which likely contributes.    HPI elements addressed above in the assessment and plan including problems, diagnosis,  stability/instability,  improving/worsening, and chronicity will not be duplicated in this section. Any important additional HPI topics will be discussed here if needed.    Active Ambulatory Problems     Diagnosis Date Noted    Endometrial ca 08/01/2012    Fatigue 08/01/2012    Dermographia 08/23/2012    Morbid obesity due to excess calories 07/09/2013    Venous insufficiency (chronic) (peripheral) 07/18/2013    CKD (chronic kidney disease), stage III 08/27/2013    Sciatica of right side 09/19/2013    Lumbar stenosis 10/15/2013    Thoracic, thoracolumbar and lumbosacral intervertebral disc disorder 10/15/2013    Allergic rhinitis 10/17/2013    S/P AKA (above knee amputation) 06/28/2014    Phantom limb syndrome with pain 08/29/2015    Depression 08/29/2015     Resolved Ambulatory Problems     Diagnosis Date Noted    DVT (deep venous thrombosis) 11/29/2012    DVT of popliteal vein 01/23/2013    Elevated liver enzymes 02/21/2013    DVT (deep venous thrombosis) 07/10/2013    Cellulitis and abscess of lower leg 12/17/2013    DVT (deep venous thrombosis) 12/17/2013     Past Medical History:   Diagnosis Date    Allergy     Arthritis     Thyroid disease          Review of Systems   HENT: Negative.    Respiratory: Negative.    Cardiovascular: Negative.    Gastrointestinal: Negative.    Neurological: Positive for weakness.   Psychiatric/Behavioral: Positive for confusion and hallucinations. Negative for agitation and behavioral problems.        She complains of trouble sleeping at night but admits she sleeps most of the day       Objective     Physical Exam   Constitutional: She appears well-developed and well-nourished. No distress.   HENT:   Head: Normocephalic and atraumatic.   Eyes: No scleral icterus.   Neck: No JVD present.   Cardiovascular: Normal rate, regular rhythm and normal heart sounds.   No murmur heard.  1+edema and venous insufficiency changes left leg.  Status post right above the knee  amputation   Pulmonary/Chest: Effort normal and breath sounds normal. No respiratory distress. She has no wheezes. She has no rales.   Abdominal: Soft. Bowel sounds are normal. She exhibits no distension.   Neurological: She is alert.   Skin: She is not diaphoretic.   Vitals reviewed.    There were no vitals filed for this visit.

## 2019-12-31 NOTE — PROGRESS NOTES
THIS DOCUMENT WAS MADE IN PART WITH VOICE RECOGNITION SOFTWARE.  OCCASIONALLY THIS SOFTWARE WILL MISINTERPRET WORDS OR PHRASES.      Nahun Cohen  1940    routine external nursing home visit   [facility]    Complete past medical history, medications, allergies, current vitals, and labs are available in the nursing home chart.  The nursing home chart is more up-to-date than the electronic medical record here.  If there are any discrepancies or questions, deferred the nursing home chart.    Diagnoses and all orders for this visit:    Phantom limb syndrome with pain   as discussed below    MCI (mild cognitive impairment) with memory loss   gradually worsening    Chronic bronchitis, unspecified chronic bronchitis type   this chronic condition is stable      Subjective         HPI     patient resting in her bed as usual. She is awake. I was accompanied by her nurse Nicolette Trimble. The primary issue continues to be her phantom limb syndrome on the right. Because of her cognitive impairment she has difficulty understanding that her leg is truly not they are. Despite exhaustive efforts of staff and myself to convince her of this she forgets so we do our best to briefly address her symptoms and move on. My recommendation is to massage her stump, apply the Voltaren gel when this occurs. I do not recommend increasing any additional medicines that could be further cognitively impairing, continue gabapentin for now. Her COPD has been stable without any issues.    Active Ambulatory Problems     Diagnosis Date Noted    Endometrial ca 08/01/2012    Fatigue 08/01/2012    Dermographia 08/23/2012    Morbid obesity due to excess calories 07/09/2013    Venous insufficiency (chronic) (peripheral) 07/18/2013    CKD (chronic kidney disease), stage III 08/27/2013    Sciatica of right side 09/19/2013    Lumbar stenosis 10/15/2013    Thoracic, thoracolumbar and lumbosacral intervertebral disc disorder  10/15/2013    Allergic rhinitis 10/17/2013    S/P AKA (above knee amputation) 06/28/2014    Phantom limb syndrome with pain 08/29/2015    Depression 08/29/2015     Resolved Ambulatory Problems     Diagnosis Date Noted    DVT (deep venous thrombosis) 11/29/2012    DVT of popliteal vein 01/23/2013    Elevated liver enzymes 02/21/2013    DVT (deep venous thrombosis) 07/10/2013    Cellulitis and abscess of lower leg 12/17/2013    DVT (deep venous thrombosis) 12/17/2013     Past Medical History:   Diagnosis Date    Allergy     Arthritis     Thyroid disease          Review of Systems   Constitutional: Positive for fatigue.   HENT: Negative for trouble swallowing.    Respiratory: Negative for shortness of breath.    Cardiovascular: Negative for chest pain.   Gastrointestinal: Negative.    Neurological: Positive for weakness and numbness.   Psychiatric/Behavioral: Positive for confusion and sleep disturbance ( She sleeps all day and stays up all night). Negative for dysphoric mood.       Objective     Physical Exam   Constitutional: She appears well-developed and well-nourished. No distress.   HENT:   Head: Normocephalic and atraumatic.   Eyes: Conjunctivae are normal. No scleral icterus.   Neck: No JVD present.   Cardiovascular: Normal rate, regular rhythm and normal heart sounds.   No murmur heard.  1+edema and venous insufficiency left leg.  Status post right above the knee amputation   Pulmonary/Chest: Effort normal and breath sounds normal. No respiratory distress. She has no wheezes. She has no rales.   Abdominal: Soft. Bowel sounds are normal. She exhibits no distension. There is no tenderness.   Neurological: She is alert.   Skin: She is not diaphoretic.   Vitals reviewed.

## 2020-01-20 ENCOUNTER — TELEPHONE (OUTPATIENT)
Dept: FAMILY MEDICINE | Facility: CLINIC | Age: 80
End: 2020-01-20

## 2020-01-20 RX ORDER — OXYCODONE AND ACETAMINOPHEN 5; 325 MG/1; MG/1
1 TABLET ORAL EVERY 4 HOURS PRN
Qty: 120 TABLET | Refills: 0 | Status: SHIPPED | OUTPATIENT
Start: 2020-01-20 | End: 2020-02-28 | Stop reason: SDUPTHER

## 2020-01-20 NOTE — TELEPHONE ENCOUNTER
Pt requests refill. Last filled 12/9/19. Last ov 12/7/19  Summary: Take 1 tablet by mouth every 4 (four) hours as needed for Pain., Starting Mon 12/9/2019, Print   Dose, Route, Frequency: 1 tablet, Oral, Every 4 hours PRN    Pharmacy: [facility] - [place], [place] - [address]          View all oxyCODONE-acetaminophen notes       Patient Sig: Take 1 tablet by mouth every 4 (four) hours as needed for Pain.              Authorized by: KARINA CASTRO       Dispense: 120 tablet       Refills: 0 ordered

## 2020-01-24 ENCOUNTER — OFFICE VISIT (OUTPATIENT)
Dept: FAMILY MEDICINE | Facility: CLINIC | Age: 80
End: 2020-01-24
Payer: MEDICARE

## 2020-01-24 DIAGNOSIS — Z89.611 STATUS POST ABOVE-KNEE AMPUTATION OF RIGHT LOWER EXTREMITY: ICD-10-CM

## 2020-01-24 DIAGNOSIS — N18.30 CKD (CHRONIC KIDNEY DISEASE), STAGE III: ICD-10-CM

## 2020-01-24 DIAGNOSIS — J42 CHRONIC BRONCHITIS, UNSPECIFIED CHRONIC BRONCHITIS TYPE: ICD-10-CM

## 2020-01-24 DIAGNOSIS — G31.84 MCI (MILD COGNITIVE IMPAIRMENT) WITH MEMORY LOSS: Primary | ICD-10-CM

## 2020-01-24 DIAGNOSIS — G54.6 PHANTOM LIMB SYNDROME WITH PAIN: ICD-10-CM

## 2020-01-24 DIAGNOSIS — E66.01 MORBID OBESITY DUE TO EXCESS CALORIES: ICD-10-CM

## 2020-01-24 PROCEDURE — 99308 SBSQ NF CARE LOW MDM 20: CPT | Mod: S$PBB,,, | Performed by: FAMILY MEDICINE

## 2020-01-24 PROCEDURE — 99308 PR NURSING FAC CARE, SUBSEQ, MINOR COMPLIC: ICD-10-PCS | Mod: S$PBB,,, | Performed by: FAMILY MEDICINE

## 2020-01-24 PROCEDURE — 99212 OFFICE O/P EST SF 10 MIN: CPT | Mod: PBBFAC,PN | Performed by: FAMILY MEDICINE

## 2020-01-24 PROCEDURE — 99999 PR PBB SHADOW E&M-EST. PATIENT-LVL II: CPT | Mod: PBBFAC,,, | Performed by: FAMILY MEDICINE

## 2020-01-24 PROCEDURE — 99999 PR PBB SHADOW E&M-EST. PATIENT-LVL II: ICD-10-PCS | Mod: PBBFAC,,, | Performed by: FAMILY MEDICINE

## 2020-01-24 NOTE — PROGRESS NOTES
" THIS DOCUMENT WAS MADE IN PART WITH VOICE RECOGNITION SOFTWARE.  OCCASIONALLY THIS SOFTWARE WILL MISINTERPRET WORDS OR PHRASES.      Nahun Cohen  1940    routine external nursing home visit  [facility]    Complete past medical history, medications, allergies, current vitals, and labs are available in the nursing home chart.  The nursing home chart is more up-to-date than the electronic medical record here.  If there are any discrepancies or questions, deferred the nursing home chart.    Diagnoses and all orders for this visit:    MCI (mild cognitive impairment) with memory loss  Gradually worsening    Chronic bronchitis, unspecified chronic bronchitis type  Stable    CKD (chronic kidney disease), stage III  Stable    Status post above-knee amputation of right lower extremity  Stable    Phantom limb syndrome with pain  Stable    Morbid obesity due to excess calories  stable      Subjective         HPI     Routine visit. Patient seems a little confused today. Staff reports that she had been more forgetful as well.   Although she volunteers that her "leg has been well", stating the pain although "I didn't realize it would be so short" referring to Her right leg invitation. She has a history of phantom limb syndrome that waxes and wanes.     Most notable, she is laying completely flat except for her head which is just slightly elevated by a pillow and she has illustrate on her chest attempting to eat while laying flat. She's resistant to sitting up but eventually she agreed to set up some. She denies any choking or difficulty swallowing.    Active Ambulatory Problems     Diagnosis Date Noted    Endometrial ca 08/01/2012    Fatigue 08/01/2012    Dermographia 08/23/2012    Morbid obesity due to excess calories 07/09/2013    Venous insufficiency (chronic) (peripheral) 07/18/2013    CKD (chronic kidney disease), stage III 08/27/2013    Sciatica of right side 09/19/2013    Lumbar stenosis " 10/15/2013    Thoracic, thoracolumbar and lumbosacral intervertebral disc disorder 10/15/2013    Allergic rhinitis 10/17/2013    S/P AKA (above knee amputation) 06/28/2014    Phantom limb syndrome with pain 08/29/2015    Depression 08/29/2015     Resolved Ambulatory Problems     Diagnosis Date Noted    DVT (deep venous thrombosis) 11/29/2012    DVT of popliteal vein 01/23/2013    Elevated liver enzymes 02/21/2013    DVT (deep venous thrombosis) 07/10/2013    Cellulitis and abscess of lower leg 12/17/2013    DVT (deep venous thrombosis) 12/17/2013     Past Medical History:   Diagnosis Date    Allergy     Arthritis     Thyroid disease          Review of Systems   Constitutional: Negative for activity change and unexpected weight change.   Respiratory: Negative for shortness of breath.    Cardiovascular: Negative for chest pain.   Neurological: Positive for weakness.       Objective     Physical Exam   Constitutional: She appears well-developed and well-nourished. No distress.   HENT:   Head: Normocephalic and atraumatic.   Eyes: No scleral icterus.   Cardiovascular: Normal rate, regular rhythm and normal heart sounds.   No murmur heard.  1+edema and venous insufficiency changes left leg.  S/p right AKA   Pulmonary/Chest: Effort normal and breath sounds normal. No respiratory distress. She has no wheezes. She has no rales.   Abdominal: Soft. Bowel sounds are normal. She exhibits no distension.   Neurological: She is alert.   Skin: She is not diaphoretic.   Vitals reviewed.        MOST RECENT LABS IN OUR ELECTRONIC MEDICAL RECORD:   Most recent labs and xrays reviewed in nursing home chart.

## 2020-02-28 RX ORDER — OXYCODONE AND ACETAMINOPHEN 5; 325 MG/1; MG/1
1 TABLET ORAL EVERY 4 HOURS PRN
Qty: 120 TABLET | Refills: 0 | Status: SHIPPED | OUTPATIENT
Start: 2020-02-28 | End: 2020-04-08 | Stop reason: SDUPTHER

## 2020-02-28 NOTE — TELEPHONE ENCOUNTER
----- Message from Ashley Marie sent at 2/28/2020 11:53 AM CST -----  Contact: celestine with heryaneth gudino # [phone]   celestine with belinda gudino # [phone] requesting hard script on oxycodone    Patient will be using   [facility] - [place] - [address]   [place] [address]  Phone: [phone] Fax: [phone]    Thanks!

## 2020-04-08 RX ORDER — OXYCODONE AND ACETAMINOPHEN 5; 325 MG/1; MG/1
1 TABLET ORAL EVERY 4 HOURS PRN
Qty: 120 TABLET | Refills: 0 | Status: SHIPPED | OUTPATIENT
Start: 2020-04-08

## 2020-04-08 NOTE — TELEPHONE ENCOUNTER
----- Message from Leidy Mukherjee sent at 4/8/2020 10:23 AM CDT -----  Contact: [facility]   Type:  RX Refill Request    Who Called:  Patient   Refill or New Rx:  refill  RX Name and Strength:  oxyCODONE-acetaminophen (PERCOCET) 5-325 mg per tablet  How is the patient currently taking it? (ex. 1XDay):  As directed  Is this a 30 day or 90 day RX:  30   Preferred Pharmacy with phone number:    [facility]  [place] - [address] [facility] [place] [address]  Phone: [phone] Fax: [phone]  Local or Mail Order:  Local    Ordering Provider:  Ashley   Best Call Back Number:  [phone]  Additional Information:  Please advise-thank you

## 2020-12-03 PROBLEM — C50.911 INVASIVE DUCTAL CARCINOMA OF BREAST, FEMALE, RIGHT: Status: ACTIVE | Noted: 2020-12-03
